# Patient Record
Sex: MALE | Race: WHITE | NOT HISPANIC OR LATINO | URBAN - METROPOLITAN AREA
[De-identification: names, ages, dates, MRNs, and addresses within clinical notes are randomized per-mention and may not be internally consistent; named-entity substitution may affect disease eponyms.]

---

## 2018-01-24 ENCOUNTER — EMERGENCY (EMERGENCY)
Facility: HOSPITAL | Age: 78
LOS: 1 days | Discharge: HOME | End: 2018-01-24
Attending: EMERGENCY MEDICINE
Payer: COMMERCIAL

## 2018-01-24 VITALS
TEMPERATURE: 98 F | OXYGEN SATURATION: 97 % | RESPIRATION RATE: 18 BRPM | HEART RATE: 73 BPM | SYSTOLIC BLOOD PRESSURE: 116 MMHG | DIASTOLIC BLOOD PRESSURE: 68 MMHG

## 2018-01-24 PROCEDURE — 73630 X-RAY EXAM OF FOOT: CPT | Mod: 26,RT

## 2018-01-24 PROCEDURE — 29515 APPLICATION SHORT LEG SPLINT: CPT | Mod: RT

## 2018-01-24 PROCEDURE — 99284 EMERGENCY DEPT VISIT MOD MDM: CPT

## 2018-01-24 PROCEDURE — 99284 EMERGENCY DEPT VISIT MOD MDM: CPT | Mod: 25

## 2018-01-24 PROCEDURE — 73610 X-RAY EXAM OF ANKLE: CPT

## 2018-01-24 PROCEDURE — 73630 X-RAY EXAM OF FOOT: CPT

## 2018-01-24 PROCEDURE — 73610 X-RAY EXAM OF ANKLE: CPT | Mod: 26,RT

## 2018-01-24 RX ORDER — ACETAMINOPHEN 500 MG
975 TABLET ORAL ONCE
Qty: 0 | Refills: 0 | Status: COMPLETED | OUTPATIENT
Start: 2018-01-24 | End: 2018-01-24

## 2018-01-24 RX ADMIN — Medication 975 MILLIGRAM(S): at 17:16

## 2018-01-24 NOTE — ED PROVIDER NOTE - CARE PLAN
Principal Discharge DX:	Acute gout involving toe of right foot, unspecified cause Principal Discharge DX:	Sprain of other ligament of right ankle, initial encounter

## 2018-01-24 NOTE — ED PROVIDER NOTE - PROGRESS NOTE DETAILS
pain not significantly changed, xr wnl. d/w pt regarding f/u and return. pt to be splinted by mil. .

## 2018-01-24 NOTE — ED PROVIDER NOTE - OBJECTIVE STATEMENT
#[id number]. 78 y/o M pt with no PMHx and no PSHx presents to ED c/o R foot pain with associated R foot swelling s/p dropping heavy luggage on his R foot today. Pt reports pain is worsen when walking. Pt denies numbness, tingling, or any other complaints. Pt also denies Hx of memory problems, Hx of smoking, EtOH use/abuse, drug use/abuse, Hx of DM, Hx of HLD, Hx of HTN, or Hx of MI. NKDA.

## 2018-01-24 NOTE — ED PROVIDER NOTE - PRINCIPAL DIAGNOSIS
Acute gout involving toe of right foot, unspecified cause Sprain of other ligament of right ankle, initial encounter

## 2018-01-24 NOTE — ED PROVIDER NOTE - MEDICAL DECISION MAKING DETAILS
76 y/o M pt presents with R ankle and R foot pain. Plan for X-Ray of R ankle, X-Ray of R foot, pain medication, and reassess. 78 y/o M pt presents with R ankle and R foot pain. Plan for X-Ray of   R foot, pain medication, and reassess. pt in hard sole shoe

## 2018-01-26 NOTE — CONSULT NOTE ADULT - SUBJECTIVE AND OBJECTIVE BOX
HPI: Patient is 78 y/o M who presents to ED c/o R foot and ankle pain and swelling. Patient states that he dropped a heavy luggage on his R foot today. Pt reports pain is worsen when walking      PAST MEDICAL & SURGICAL HISTORY:  No pertinent past medical history  No significant past surgical history      Review of systems: Non Contributory    MEDICATIONS  (STANDING):    Allergies: No known Allergies    Physical Examination:    Musculoskeletal:   Right foot and ankle area examination shows swelling on the dorsum of the foot, as well as the lateral side of the ankle area. There is pain to palpation and range of motion is decreased.     Neurovascularly Intact    RADIOLOGY & ADDITIONAL STUDIES: X-ray of right foot and ankle done today is unremarkable.     ASSESSMENT: Right foot ankle sprain.     PLAN/RECOMMENDATION: Short leg splint was applied. Cane was given.     FOLLOW UP: fu 1-2 weeks.

## 2019-05-27 ENCOUNTER — INPATIENT (INPATIENT)
Facility: HOSPITAL | Age: 79
LOS: 0 days | Discharge: HOME | DRG: 195 | End: 2019-05-28
Attending: HOSPITALIST | Admitting: HOSPITALIST
Payer: COMMERCIAL

## 2019-05-28 VITALS
HEART RATE: 85 BPM | WEIGHT: 164.91 LBS | HEIGHT: 60 IN | TEMPERATURE: 99 F | SYSTOLIC BLOOD PRESSURE: 161 MMHG | OXYGEN SATURATION: 96 % | DIASTOLIC BLOOD PRESSURE: 77 MMHG | RESPIRATION RATE: 20 BRPM

## 2019-05-28 VITALS
RESPIRATION RATE: 18 BRPM | DIASTOLIC BLOOD PRESSURE: 63 MMHG | SYSTOLIC BLOOD PRESSURE: 124 MMHG | HEART RATE: 58 BPM | OXYGEN SATURATION: 100 % | TEMPERATURE: 98 F

## 2019-05-28 DIAGNOSIS — J18.9 PNEUMONIA, UNSPECIFIED ORGANISM: ICD-10-CM

## 2019-05-28 DIAGNOSIS — Z29.9 ENCOUNTER FOR PROPHYLACTIC MEASURES, UNSPECIFIED: ICD-10-CM

## 2019-05-28 DIAGNOSIS — I10 ESSENTIAL (PRIMARY) HYPERTENSION: ICD-10-CM

## 2019-05-28 DIAGNOSIS — E78.5 HYPERLIPIDEMIA, UNSPECIFIED: ICD-10-CM

## 2019-05-28 LAB
24R-OH-CALCIDIOL SERPL-MCNC: 19.3 NG/ML — LOW (ref 30–80)
ALBUMIN SERPL ELPH-MCNC: 3.6 G/DL — SIGNIFICANT CHANGE UP (ref 3.5–5)
ALP SERPL-CCNC: 79 U/L — SIGNIFICANT CHANGE UP (ref 40–120)
ALT FLD-CCNC: 19 U/L DA — SIGNIFICANT CHANGE UP (ref 10–60)
ANION GAP SERPL CALC-SCNC: 7 MMOL/L — SIGNIFICANT CHANGE UP (ref 5–17)
ANION GAP SERPL CALC-SCNC: 7 MMOL/L — SIGNIFICANT CHANGE UP (ref 5–17)
APPEARANCE UR: CLEAR — SIGNIFICANT CHANGE UP
APTT BLD: 37.1 SEC — HIGH (ref 27.5–36.3)
AST SERPL-CCNC: 21 U/L — SIGNIFICANT CHANGE UP (ref 10–40)
BASOPHILS # BLD AUTO: 0.02 K/UL — SIGNIFICANT CHANGE UP (ref 0–0.2)
BASOPHILS # BLD AUTO: 0.03 K/UL — SIGNIFICANT CHANGE UP (ref 0–0.2)
BASOPHILS NFR BLD AUTO: 0.2 % — SIGNIFICANT CHANGE UP (ref 0–2)
BASOPHILS NFR BLD AUTO: 0.3 % — SIGNIFICANT CHANGE UP (ref 0–2)
BILIRUB SERPL-MCNC: 0.9 MG/DL — SIGNIFICANT CHANGE UP (ref 0.2–1.2)
BILIRUB UR-MCNC: NEGATIVE — SIGNIFICANT CHANGE UP
BUN SERPL-MCNC: 10 MG/DL — SIGNIFICANT CHANGE UP (ref 7–18)
BUN SERPL-MCNC: 19 MG/DL — HIGH (ref 7–18)
CALCIUM SERPL-MCNC: 8.2 MG/DL — LOW (ref 8.4–10.5)
CALCIUM SERPL-MCNC: 8.2 MG/DL — LOW (ref 8.4–10.5)
CHLORIDE SERPL-SCNC: 107 MMOL/L — SIGNIFICANT CHANGE UP (ref 96–108)
CHLORIDE SERPL-SCNC: 110 MMOL/L — HIGH (ref 96–108)
CHOLEST SERPL-MCNC: 170 MG/DL — SIGNIFICANT CHANGE UP (ref 10–199)
CO2 SERPL-SCNC: 24 MMOL/L — SIGNIFICANT CHANGE UP (ref 22–31)
CO2 SERPL-SCNC: 26 MMOL/L — SIGNIFICANT CHANGE UP (ref 22–31)
COLOR SPEC: YELLOW — SIGNIFICANT CHANGE UP
CREAT SERPL-MCNC: 0.72 MG/DL — SIGNIFICANT CHANGE UP (ref 0.5–1.3)
CREAT SERPL-MCNC: 0.74 MG/DL — SIGNIFICANT CHANGE UP (ref 0.5–1.3)
DIFF PNL FLD: NEGATIVE — SIGNIFICANT CHANGE UP
EOSINOPHIL # BLD AUTO: 0.07 K/UL — SIGNIFICANT CHANGE UP (ref 0–0.5)
EOSINOPHIL # BLD AUTO: 0.08 K/UL — SIGNIFICANT CHANGE UP (ref 0–0.5)
EOSINOPHIL NFR BLD AUTO: 0.6 % — SIGNIFICANT CHANGE UP (ref 0–6)
EOSINOPHIL NFR BLD AUTO: 0.6 % — SIGNIFICANT CHANGE UP (ref 0–6)
GLUCOSE SERPL-MCNC: 111 MG/DL — HIGH (ref 70–99)
GLUCOSE SERPL-MCNC: 128 MG/DL — HIGH (ref 70–99)
GLUCOSE UR QL: NEGATIVE — SIGNIFICANT CHANGE UP
HBA1C BLD-MCNC: 5.7 % — HIGH (ref 4–5.6)
HCT VFR BLD CALC: 39.3 % — SIGNIFICANT CHANGE UP (ref 39–50)
HCT VFR BLD CALC: 40 % — SIGNIFICANT CHANGE UP (ref 39–50)
HDLC SERPL-MCNC: 58 MG/DL — SIGNIFICANT CHANGE UP
HGB BLD-MCNC: 12.5 G/DL — LOW (ref 13–17)
HGB BLD-MCNC: 13.1 G/DL — SIGNIFICANT CHANGE UP (ref 13–17)
IMM GRANULOCYTES NFR BLD AUTO: 0.4 % — SIGNIFICANT CHANGE UP (ref 0–1.5)
IMM GRANULOCYTES NFR BLD AUTO: 0.6 % — SIGNIFICANT CHANGE UP (ref 0–1.5)
INR BLD: 1.17 RATIO — HIGH (ref 0.88–1.16)
KETONES UR-MCNC: ABNORMAL
LACTATE SERPL-SCNC: 0.8 MMOL/L — SIGNIFICANT CHANGE UP (ref 0.7–2)
LEUKOCYTE ESTERASE UR-ACNC: ABNORMAL
LIPID PNL WITH DIRECT LDL SERPL: 97 MG/DL — SIGNIFICANT CHANGE UP
LYMPHOCYTES # BLD AUTO: 0.7 K/UL — LOW (ref 1–3.3)
LYMPHOCYTES # BLD AUTO: 1.31 K/UL — SIGNIFICANT CHANGE UP (ref 1–3.3)
LYMPHOCYTES # BLD AUTO: 11.7 % — LOW (ref 13–44)
LYMPHOCYTES # BLD AUTO: 5.6 % — LOW (ref 13–44)
MAGNESIUM SERPL-MCNC: 2.2 MG/DL — SIGNIFICANT CHANGE UP (ref 1.6–2.6)
MCHC RBC-ENTMCNC: 31.1 PG — SIGNIFICANT CHANGE UP (ref 27–34)
MCHC RBC-ENTMCNC: 31.3 PG — SIGNIFICANT CHANGE UP (ref 27–34)
MCHC RBC-ENTMCNC: 31.8 GM/DL — LOW (ref 32–36)
MCHC RBC-ENTMCNC: 32.8 GM/DL — SIGNIFICANT CHANGE UP (ref 32–36)
MCV RBC AUTO: 95.7 FL — SIGNIFICANT CHANGE UP (ref 80–100)
MCV RBC AUTO: 97.8 FL — SIGNIFICANT CHANGE UP (ref 80–100)
MONOCYTES # BLD AUTO: 0.53 K/UL — SIGNIFICANT CHANGE UP (ref 0–0.9)
MONOCYTES # BLD AUTO: 0.69 K/UL — SIGNIFICANT CHANGE UP (ref 0–0.9)
MONOCYTES NFR BLD AUTO: 4.7 % — SIGNIFICANT CHANGE UP (ref 2–14)
MONOCYTES NFR BLD AUTO: 5.5 % — SIGNIFICANT CHANGE UP (ref 2–14)
NEUTROPHILS # BLD AUTO: 11.03 K/UL — HIGH (ref 1.8–7.4)
NEUTROPHILS # BLD AUTO: 9.2 K/UL — HIGH (ref 1.8–7.4)
NEUTROPHILS NFR BLD AUTO: 82.3 % — HIGH (ref 43–77)
NEUTROPHILS NFR BLD AUTO: 87.5 % — HIGH (ref 43–77)
NITRITE UR-MCNC: NEGATIVE — SIGNIFICANT CHANGE UP
NRBC # BLD: 0 /100 WBCS — SIGNIFICANT CHANGE UP (ref 0–0)
NRBC # BLD: 0 /100 WBCS — SIGNIFICANT CHANGE UP (ref 0–0)
PH UR: 5 — SIGNIFICANT CHANGE UP (ref 5–8)
PHOSPHATE SERPL-MCNC: 2.2 MG/DL — LOW (ref 2.5–4.5)
PLATELET # BLD AUTO: 159 K/UL — SIGNIFICANT CHANGE UP (ref 150–400)
PLATELET # BLD AUTO: 168 K/UL — SIGNIFICANT CHANGE UP (ref 150–400)
POTASSIUM SERPL-MCNC: 3.6 MMOL/L — SIGNIFICANT CHANGE UP (ref 3.5–5.3)
POTASSIUM SERPL-MCNC: 4 MMOL/L — SIGNIFICANT CHANGE UP (ref 3.5–5.3)
POTASSIUM SERPL-SCNC: 3.6 MMOL/L — SIGNIFICANT CHANGE UP (ref 3.5–5.3)
POTASSIUM SERPL-SCNC: 4 MMOL/L — SIGNIFICANT CHANGE UP (ref 3.5–5.3)
PROCALCITONIN SERPL-MCNC: 0.05 NG/ML — SIGNIFICANT CHANGE UP (ref 0.02–0.1)
PROT SERPL-MCNC: 7.5 G/DL — SIGNIFICANT CHANGE UP (ref 6–8.3)
PROT UR-MCNC: 15
PROTHROM AB SERPL-ACNC: 13.1 SEC — HIGH (ref 10–12.9)
RBC # BLD: 4.02 M/UL — LOW (ref 4.2–5.8)
RBC # BLD: 4.18 M/UL — LOW (ref 4.2–5.8)
RBC # FLD: 13.5 % — SIGNIFICANT CHANGE UP (ref 10.3–14.5)
RBC # FLD: 13.5 % — SIGNIFICANT CHANGE UP (ref 10.3–14.5)
SODIUM SERPL-SCNC: 138 MMOL/L — SIGNIFICANT CHANGE UP (ref 135–145)
SODIUM SERPL-SCNC: 143 MMOL/L — SIGNIFICANT CHANGE UP (ref 135–145)
SP GR SPEC: 1.02 — SIGNIFICANT CHANGE UP (ref 1.01–1.02)
TOTAL CHOLESTEROL/HDL RATIO MEASUREMENT: 2.9 RATIO — LOW (ref 3.4–9.6)
TRIGL SERPL-MCNC: 74 MG/DL — SIGNIFICANT CHANGE UP (ref 10–149)
UROBILINOGEN FLD QL: NEGATIVE — SIGNIFICANT CHANGE UP
WBC # BLD: 11.18 K/UL — HIGH (ref 3.8–10.5)
WBC # BLD: 12.59 K/UL — HIGH (ref 3.8–10.5)
WBC # FLD AUTO: 11.18 K/UL — HIGH (ref 3.8–10.5)
WBC # FLD AUTO: 12.59 K/UL — HIGH (ref 3.8–10.5)

## 2019-05-28 PROCEDURE — 82306 VITAMIN D 25 HYDROXY: CPT

## 2019-05-28 PROCEDURE — 96365 THER/PROPH/DIAG IV INF INIT: CPT

## 2019-05-28 PROCEDURE — 99285 EMERGENCY DEPT VISIT HI MDM: CPT

## 2019-05-28 PROCEDURE — 93005 ELECTROCARDIOGRAM TRACING: CPT

## 2019-05-28 PROCEDURE — 81001 URINALYSIS AUTO W/SCOPE: CPT

## 2019-05-28 PROCEDURE — 87086 URINE CULTURE/COLONY COUNT: CPT

## 2019-05-28 PROCEDURE — 83605 ASSAY OF LACTIC ACID: CPT

## 2019-05-28 PROCEDURE — 83735 ASSAY OF MAGNESIUM: CPT

## 2019-05-28 PROCEDURE — 85730 THROMBOPLASTIN TIME PARTIAL: CPT

## 2019-05-28 PROCEDURE — 99285 EMERGENCY DEPT VISIT HI MDM: CPT | Mod: 25

## 2019-05-28 PROCEDURE — 85027 COMPLETE CBC AUTOMATED: CPT

## 2019-05-28 PROCEDURE — 87040 BLOOD CULTURE FOR BACTERIA: CPT

## 2019-05-28 PROCEDURE — 84145 PROCALCITONIN (PCT): CPT

## 2019-05-28 PROCEDURE — 99223 1ST HOSP IP/OBS HIGH 75: CPT

## 2019-05-28 PROCEDURE — 80048 BASIC METABOLIC PNL TOTAL CA: CPT

## 2019-05-28 PROCEDURE — 71046 X-RAY EXAM CHEST 2 VIEWS: CPT

## 2019-05-28 PROCEDURE — 80061 LIPID PANEL: CPT

## 2019-05-28 PROCEDURE — 83036 HEMOGLOBIN GLYCOSYLATED A1C: CPT

## 2019-05-28 PROCEDURE — 36415 COLL VENOUS BLD VENIPUNCTURE: CPT

## 2019-05-28 PROCEDURE — 93010 ELECTROCARDIOGRAM REPORT: CPT

## 2019-05-28 PROCEDURE — 87186 SC STD MICRODIL/AGAR DIL: CPT

## 2019-05-28 PROCEDURE — 85610 PROTHROMBIN TIME: CPT

## 2019-05-28 PROCEDURE — 71046 X-RAY EXAM CHEST 2 VIEWS: CPT | Mod: 26

## 2019-05-28 PROCEDURE — 96375 TX/PRO/DX INJ NEW DRUG ADDON: CPT

## 2019-05-28 PROCEDURE — 80053 COMPREHEN METABOLIC PANEL: CPT

## 2019-05-28 PROCEDURE — 84100 ASSAY OF PHOSPHORUS: CPT

## 2019-05-28 RX ORDER — SIMVASTATIN 20 MG/1
20 TABLET, FILM COATED ORAL AT BEDTIME
Refills: 0 | Status: DISCONTINUED | OUTPATIENT
Start: 2019-05-28 | End: 2019-05-28

## 2019-05-28 RX ORDER — SODIUM CHLORIDE 9 MG/ML
2300 INJECTION INTRAMUSCULAR; INTRAVENOUS; SUBCUTANEOUS ONCE
Refills: 0 | Status: COMPLETED | OUTPATIENT
Start: 2019-05-28 | End: 2019-05-28

## 2019-05-28 RX ORDER — LOSARTAN POTASSIUM 100 MG/1
1 TABLET, FILM COATED ORAL
Qty: 0 | Refills: 0 | DISCHARGE
Start: 2019-05-28

## 2019-05-28 RX ORDER — CIPROFLOXACIN LACTATE 400MG/40ML
1 VIAL (ML) INTRAVENOUS
Qty: 5 | Refills: 0
Start: 2019-05-28 | End: 2019-06-01

## 2019-05-28 RX ORDER — AZITHROMYCIN 500 MG/1
500 TABLET, FILM COATED ORAL EVERY 24 HOURS
Refills: 0 | Status: DISCONTINUED | OUTPATIENT
Start: 2019-05-28 | End: 2019-05-28

## 2019-05-28 RX ORDER — AZITHROMYCIN 500 MG/1
500 TABLET, FILM COATED ORAL ONCE
Refills: 0 | Status: COMPLETED | OUTPATIENT
Start: 2019-05-28 | End: 2019-05-28

## 2019-05-28 RX ORDER — CEFTRIAXONE 500 MG/1
1 INJECTION, POWDER, FOR SOLUTION INTRAMUSCULAR; INTRAVENOUS ONCE
Refills: 0 | Status: COMPLETED | OUTPATIENT
Start: 2019-05-28 | End: 2019-05-28

## 2019-05-28 RX ORDER — CEFTRIAXONE 500 MG/1
1 INJECTION, POWDER, FOR SOLUTION INTRAMUSCULAR; INTRAVENOUS EVERY 24 HOURS
Refills: 0 | Status: DISCONTINUED | OUTPATIENT
Start: 2019-05-28 | End: 2019-05-28

## 2019-05-28 RX ORDER — LOSARTAN POTASSIUM 100 MG/1
50 TABLET, FILM COATED ORAL DAILY
Refills: 0 | Status: DISCONTINUED | OUTPATIENT
Start: 2019-05-28 | End: 2019-05-28

## 2019-05-28 RX ORDER — SIMVASTATIN 20 MG/1
1 TABLET, FILM COATED ORAL
Qty: 0 | Refills: 0 | DISCHARGE
Start: 2019-05-28

## 2019-05-28 RX ADMIN — CEFTRIAXONE 100 GRAM(S): 500 INJECTION, POWDER, FOR SOLUTION INTRAMUSCULAR; INTRAVENOUS at 07:26

## 2019-05-28 RX ADMIN — AZITHROMYCIN 250 MILLIGRAM(S): 500 TABLET, FILM COATED ORAL at 04:34

## 2019-05-28 RX ADMIN — AZITHROMYCIN 250 MILLIGRAM(S): 500 TABLET, FILM COATED ORAL at 09:23

## 2019-05-28 RX ADMIN — SODIUM CHLORIDE 2300 MILLILITER(S): 9 INJECTION INTRAMUSCULAR; INTRAVENOUS; SUBCUTANEOUS at 02:02

## 2019-05-28 RX ADMIN — CEFTRIAXONE 100 GRAM(S): 500 INJECTION, POWDER, FOR SOLUTION INTRAMUSCULAR; INTRAVENOUS at 01:06

## 2019-05-28 RX ADMIN — CEFTRIAXONE 1 GRAM(S): 500 INJECTION, POWDER, FOR SOLUTION INTRAMUSCULAR; INTRAVENOUS at 02:02

## 2019-05-28 RX ADMIN — SODIUM CHLORIDE 2300 MILLILITER(S): 9 INJECTION INTRAMUSCULAR; INTRAVENOUS; SUBCUTANEOUS at 01:06

## 2019-05-28 NOTE — PROGRESS NOTE ADULT - SUBJECTIVE AND OBJECTIVE BOX
NP Note     78 year old male from home with PMHx of vertigo, HTN, and HLD presented with cough and fever. CXR showed retrosternal infiltrate. Pt admitted for community acquired pneumonia. IV antibiotics:  azithromycin and Rocephin continued. Blood culture pending. Pt seen and examined, feeling better. Denies fever, chills. VS stable   Pt seen and examined, feeling better, co of intermittent cough. Denies fever, chills, chest pain or SOB. VS stable          INTERVAL HPI/OVERNIGHT EVENTS: no new complaints    MEDICATIONS  (STANDING):  azithromycin  IVPB 500 milliGRAM(s) IV Intermittent every 24 hours  cefTRIAXone   IVPB 1 Gram(s) IV Intermittent every 24 hours    MEDICATIONS  (PRN):      __________________________________________________  REVIEW OF SYSTEMS:    CONSTITUTIONAL: No fever,   EYES: no acute visual disturbances  NECK: No pain or stiffness  RESPIRATORY: + cough; No shortness of breath  CARDIOVASCULAR: No chest pain, no palpitations  GASTROINTESTINAL: No pain. No nausea or vomiting; No diarrhea   NEUROLOGICAL: No headache or numbness, no tremors  MUSCULOSKELETAL: No joint pain, no muscle pain  GENITOURINARY: no dysuria, no frequency, no hesitancy  PSYCHIATRY: no depression , no anxiety  ALL OTHER  ROS negative        Vital Signs Last 24 Hrs  T(C): 37.1 (28 May 2019 07:13), Max: 37.3 (28 May 2019 00:02)  T(F): 98.7 (28 May 2019 07:13), Max: 99.2 (28 May 2019 00:02)  HR: 61 (28 May 2019 07:13) (61 - 85)  BP: 107/62 (28 May 2019 07:13) (102/54 - 161/77)  BP(mean): --  RR: 18 (28 May 2019 07:13) (17 - 20)  SpO2: 98% (28 May 2019 07:13) (96% - 98%)    ________________________________________________  PHYSICAL EXAM:  GENERAL: NAD  HEENT: Normocephalic;  conjunctivae and sclerae clear; moist mucous membranes;   NECK : supple  CHEST/LUNG: Clear to auscultation  HEART: S1 S2  regular;  ABDOMEN: Soft, Nontender, Nondistended; Bowel sounds present  EXTREMITIES: no cyanosis; no edema; no calf tenderness  SKIN: warm and dry; no rash  NERVOUS SYSTEM:  Awake and alert; Oriented  to place, person and time ; no new deficits    _________________________________________________  LABS:                        13.1   12.59 )-----------( 168      ( 28 May 2019 00:59 )             40.0         138  |  107  |  19<H>  ----------------------------<  128<H>  4.0   |  24  |  0.72    Ca    8.2<L>      28 May 2019 00:59    TPro  7.5  /  Alb  3.6  /  TBili  0.9  /  DBili  x   /  AST  21  /  ALT  19  /  AlkPhos  79      PT/INR - ( 28 May 2019 00:59 )   PT: 13.1 sec;   INR: 1.17 ratio         PTT - ( 28 May 2019 00:59 )  PTT:37.1 sec  Urinalysis Basic - ( 28 May 2019 00:59 )    Color: Yellow / Appearance: Clear / S.020 / pH: x  Gluc: x / Ketone: Trace  / Bili: Negative / Urobili: Negative   Blood: x / Protein: 15 / Nitrite: Negative   Leuk Esterase: Small / RBC: 0-2 /HPF / WBC 0-2 /HPF   Sq Epi: x / Non Sq Epi: Occasional /HPF / Bacteria: Few /HPF      CAPILLARY BLOOD GLUCOSE            RADIOLOGY & ADDITIONAL TESTS:    Imaging Personally Reviewed:  YES/NO    Consultant(s) Notes Reviewed:   YES/ No    Care Discussed with Consultants :     Plan of care was discussed with patient and /or primary care giver; all questions and concerns were addressed and care was aligned with patient's wishes.

## 2019-05-28 NOTE — ED ADULT NURSE NOTE - OBJECTIVE STATEMENT
pt came in for c/o fever, cough, chills & headache. Denies cp, sob. breathing unlabored, non-diaphoretic. Ambulatory w/ steady gait.

## 2019-05-28 NOTE — PROGRESS NOTE ADULT - PROBLEM SELECTOR PLAN 1
CXR + retrosternal infiltrate  - c/w Rocephin and Zithromax  - f/u blood culture   - f/u procalcitonin   - symptomatic treatment as needed

## 2019-05-28 NOTE — DISCHARGE NOTE PROVIDER - NSDCCPCAREPLAN_GEN_ALL_CORE_FT
PRINCIPAL DISCHARGE DIAGNOSIS  Diagnosis: Pneumonia  Assessment and Plan of Treatment: You were admitted to the hospital for pneumonia (lung infection)   You were treated with IV antibiotics. You need to continue oral antibiotic at home.  Please continue .......  Rest, stay well hydrated. Follow up with primary care doctor for reevaluation within one week.      SECONDARY DISCHARGE DIAGNOSES  Diagnosis: HTN (hypertension)  Assessment and Plan of Treatment: Continue Losartan 50 mg daily as prescribed by your doctor   Monitor your blood pressure. the goal is to keep your blood pressure at 140/80 or lower. Recommend the DASH Diet. This diet emphasizes vegetables, fruits, and fat-free or low-fat dairy products.  Includes whole grains, fish, poultry, beans, seeds, nuts, and vegetable oils. Please limit sodium, sweets, sugary beverages, and red meats.  Follow up with your doctor for further management    Diagnosis: HLD (hyperlipidemia)  Assessment and Plan of Treatment: Continue Simvastatin 20 mg daily. Follow up with your doctor for further management PRINCIPAL DISCHARGE DIAGNOSIS  Diagnosis: Pneumonia  Assessment and Plan of Treatment: You were admitted to the hospital for pneumonia (lung infection)   You were treated with IV antibiotics. You need to continue oral antibiotic at home.  Please continue oral antibiotic Levaquin 500 mg daily for 5 days  Rest, stay well hydrated. Follow up with primary care doctor for reevaluation within one week.      SECONDARY DISCHARGE DIAGNOSES  Diagnosis: HTN (hypertension)  Assessment and Plan of Treatment: Continue Losartan 50 mg daily as prescribed by your doctor   Monitor your blood pressure. the goal is to keep your blood pressure at 140/80 or lower. Recommend the DASH Diet. This diet emphasizes vegetables, fruits, and fat-free or low-fat dairy products.  Includes whole grains, fish, poultry, beans, seeds, nuts, and vegetable oils. Please limit sodium, sweets, sugary beverages, and red meats.  Follow up with your doctor for further management    Diagnosis: HLD (hyperlipidemia)  Assessment and Plan of Treatment: Continue Simvastatin 20 mg daily. Follow up with your doctor for further management

## 2019-05-28 NOTE — DISCHARGE NOTE PROVIDER - PROVIDER TOKENS
FREE:[LAST:[PCP],PHONE:[(   )    -],FAX:[(   )    -],ADDRESS:[Please make appointment to see your PCP within 3days of dischareg form the hospital.],FOLLOWUP:[1 week]]

## 2019-05-28 NOTE — ED ADULT TRIAGE NOTE - CHIEF COMPLAINT QUOTE
pt compliant of a fever that started on Friday, was taking Tylenol but the fever keeps going up and down and he has a cough.

## 2019-05-28 NOTE — ED PROVIDER NOTE - NS ED MD DISPO SPECIAL CONSIDERATION1
[address] FAX: [phone]        Vascular Surgery Progress Note    Admit Date: 2017  POD 1 Day Post-Op    Procedure:  Procedure(s):  IR ANESTHESIA- RIGHT LEG ARTERIOGRAM/ 604      Subjective:     Patient has no new complaints. Patient states he is tired from PT. Objective:     Blood pressure 154/83, pulse 88, temperature 98 °F (36.7 °C), resp. rate 18, height 6' 1\" (1.854 m), weight 212 lb 15.4 oz (96.6 kg), SpO2 98 %. Temp (24hrs), Av.1 °F (36.7 °C), Min:97.6 °F (36.4 °C), Max:98.6 °F (37 °C)      Physical Exam:  GENERAL: alert, cooperative, no distress, LUNG:  clear to auscultation bilaterally, HEART:  regular rate and rhythm, S1, S2 normal, no murmur, click, rub or gallop and EXTREMITIES:  Right foot covered with Kerlex. Pressure dressing remains in place to groin.  Will remove    Labs:   Recent Labs      17   1038  17   0550   HGB  6.6*   --    WBC  9.0   --    K   --   3.8   GLU   --   120*       Data Review: images and reports reviewed  Current Facility-Administered Medications   Medication Dose Route Frequency    torsemide (DEMADEX) tablet 40 mg  40 mg Oral DAILY    sodium hypochlorite (QUARTER STRENGTH DAKIN'S) 0.125% irrigation (bottle)   Topical DAILY    insulin glargine (LANTUS) injection 12 Units  12 Units SubCUTAneous DAILY    heparin (porcine) injection 5,000 Units  5,000 Units SubCUTAneous Q8H    0.9% sodium chloride infusion  125 mL/hr IntraVENous CONTINUOUS    sodium chloride (NS) flush 5 mL  5 mL InterCATHeter PRN    pantoprazole (PROTONIX) tablet 40 mg  40 mg Oral ACB    HYDROmorphone (PF) (DILAUDID) injection 2 mg  2 mg IntraVENous PRN    cefTRIAXone (ROCEPHIN) 2 g in 0.9% sodium chloride (MBP/ADV) 50 mL  2 g IntraVENous Q24H    metroNIDAZOLE (FLAGYL) tablet 500 mg  500 mg Oral TID    HYDROmorphone (PF) (DILAUDID) injection 0.5-1 mg  0.5-1 mg IntraVENous Q3H PRN    oxyCODONE IR (ROXICODONE) tablet 5 mg  5 mg Oral Q4H PRN    insulin regular (NOVOLIN R, HUMULIN R) injection   SubCUTAneous AC&HS    amLODIPine (NORVASC) tablet 10 mg  10 mg Oral DAILY    hydrALAZINE (APRESOLINE) 20 mg/mL injection 20 mg  20 mg IntraVENous Q6H PRN    haloperidol lactate (HALDOL) injection 2 mg  2 mg IntraVENous Q4H PRN    0.9% sodium chloride infusion 250 mL  250 mL IntraVENous PRN    diphenhydrAMINE (BENADRYL) injection 25 mg  25 mg IntraVENous Q4H PRN    traMADol (ULTRAM) tablet 50 mg  50 mg Oral Q6H PRN    atorvastatin (LIPITOR) tablet 80 mg  80 mg Oral DAILY    metoprolol succinate (TOPROL-XL) tablet 100 mg  100 mg Oral DAILY WITH BREAKFAST    sodium chloride (NS) flush 5-10 mL  5-10 mL IntraVENous Q8H    sodium chloride (NS) flush 5-10 mL  5-10 mL IntraVENous PRN    acetaminophen (TYLENOL) tablet 650 mg  650 mg Oral Q4H PRN    ondansetron (ZOFRAN) injection 4 mg  4 mg IntraVENous Q4H PRN    docusate sodium (COLACE) capsule 100 mg  100 mg Oral DAILY PRN     Assessment:     Principal Problem:    Type 2 diabetes mellitus with right diabetic foot infection (HCC) (1/5/2017)    Active Problems:    Type 2 diabetes mellitus with hyperglycemia (HCC) (7/29/2012)      HTN (hypertension) (7/29/2012)      Chronic systolic congestive heart failure ([address] (12/30/2015)      Overview: EF 35-40% on 2015 Echo      Kidney disease, chronic, stage III (moderate, EGFR 30-59 ml/min) (8/11/2016)      Acute renal failure with tubular necrosis ([address] (1/6/2017)      NSTEMI (non-ST elevated myocardial infarction) ([address] (1/5/2017)      Sepsis due to cellulitis ([address] (1/9/2017)      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  As a result, errors of speech recognition may have occurred. None

## 2019-05-28 NOTE — H&P ADULT - HISTORY OF PRESENT ILLNESS
Patient is a 78 Y old male from home with PMHx of vertigo, HTN, and HLD presented with cough and fever for last 3 days. Patient reports he was having high fever along with productive cough with brown sputum, headaches fatigue and malaise. Patient was taking Advil with no improvement in his symptoms. [place] recently [place] to [place] back on [place] 1 to [place]. Patient denies any sick contacts, recent hospitalization or use of antibiotics. He denies chest pian, sob, nausea, vomiting, diarrhea, abdominal pain, dysuria, skin rashes, joint pains, leg swelling.    on admission pt was afebrile, HD stable   CBC showed leucocytosis 12 with left sift   normal electrolytes and renal functions   CXR showed retro-sternal infiltrate     Primary team to follow up home medications Patient is a 78 Y old male from home with PMHx of vertigo, HTN, and HLD presented with cough and fever for last 3 days. Patient reports he was having high fever along with productive cough with brown sputum, headaches fatigue and malaise. Patient was taking Advil with no improvement in his symptoms. Patient recently traveled to [place] came back on [place] 1 to [place]. Patient denies any sick contacts, recent hospitalization or use of antibiotics. He denies chest pian, sob, nausea, vomiting, diarrhea, abdominal pain, dysuria, skin rashes, joint pains, leg swelling.    on admission pt was afebrile, HD stable   CBC showed leucocytosis 12 with left sift   normal electrolytes and renal functions   CXR showed retro-cardiac  infiltrate     Primary team to follow up home medications

## 2019-05-28 NOTE — ED PROVIDER NOTE - OBJECTIVE STATEMENT
77 y/o M pt with a PMHx of HTN and no significant PSHx of presents to the ED c/o productive cough and fever x past 3 days. Pt denies cp, sob or any other complaints. Non-smoker. NKDA.

## 2019-05-28 NOTE — H&P ADULT - ASSESSMENT
Patient is a 78 Y old male from home with PMHx of vertigo, HTN, and HLD presented with cough and fever for last 3 days. Patient reports he was having high fever along with productive cough with brown sputum, headaches fatigue and malaise. Patient was taking Advil with no improvement in his symptoms. Patient recently traveled to [place] came back on [place] 1 to [place]. Patient denies any sick contacts, recent hospitalization or use of antibiotics. He denies chest pian, sob, nausea, vomiting, diarrhea, abdominal pain, dysuria, skin rashes, joint pains, leg swelling.    on admission pt was afebrile, HD stable   CBC showed leucocytosis 12 with left sift   normal electrolytes and renal functions   CXR showed retro-sternal infiltrate Patient is a 78 Y old male from home with PMHx of vertigo, HTN, and HLD presented with cough and fever for last 3 days. Patient reports he was having high fever along with productive cough with brown sputum, headaches fatigue and malaise. Patient was taking Advil with no improvement in his symptoms. Patient recently traveled to [place] came back on [place] 1 to [place]. Patient denies any sick contacts, recent hospitalization or use of antibiotics. He denies chest pian, sob, nausea, vomiting, diarrhea, abdominal pain, dysuria, skin rashes, joint pains, leg swelling.    on admission pt was afebrile, HD stable   CBC showed leucocytosis 12 with left sift   normal electrolytes and renal functions   CXR showed retro-cardiac  infiltrate

## 2019-05-28 NOTE — DISCHARGE NOTE PROVIDER - CARE PROVIDER_API CALL
PCP,   Please make appointment to see your PCP within 3days of dischareg form the hospital.  Phone: (   )    -  Fax: (   )    -  Follow Up Time: 1 week

## 2019-05-28 NOTE — ED ADULT NURSE NOTE - ED STAT RN HANDOFF DETAILS 3
patient discharged home as per Md order, IV access removed no redness, swelling or bleeding noted at site. All discharge instructions and F/U visits provided to patient. Prescriptions sent to pharmacy. Patient and daughter verbalizes understanding leaving ambulatory in no acute distress.

## 2019-05-28 NOTE — DISCHARGE NOTE NURSING/CASE MANAGEMENT/SOCIAL WORK - NSDCDPATPORTLINK_GEN_ALL_CORE
You can access the [facility] Patient Portal, offered by [facility], by registering with the following website: [email or web address]

## 2019-05-28 NOTE — ED PROVIDER NOTE - CLINICAL SUMMARY MEDICAL DECISION MAKING FREE TEXT BOX
Risk factors (as per CURB 65 rule). BUN 19, age 78. Pt with crackles on right lung field. MAR endorsed. Pt agrees with admission for IV abx. I had a detailed discussion with the patient and/or guardian regarding the historical points, exam findings, and any diagnostic results supporting the admit diagnosis. Risk factors (as per CURB 65 rule). BUN 19, age 78. Pt with crackles on right lung field. MAR and Dr. Chakraborty endorsed. Pt agrees with admission for IV abx. I had a detailed discussion with the patient and/or guardian regarding the historical points, exam findings, and any diagnostic results supporting the admit diagnosis.

## 2019-05-28 NOTE — ED ADULT NURSE NOTE - NSSBIRTNALOXONE_GEN_A_ED
SUBJECTIVE  Chava Jewell is a 78 year old male who complains of right hip pain for 2 weeks. Onset associated with: no known injury.  Pain is aggravated by changes in positiion.  The pain does radiate to the R knee.  Prior history of back problems: no prior back problems.  Pt does not smoke and is not exposed to second hand smoke. Denies loss of bowel of bladder function.  Pt was seen in ED and did have a CT of the pelvis, pt has not followed up with primary care.  Past Medical History:   Diagnosis Date   • Anemia 2012    Iron Deficiency anemia   • ANEMIA BLOOD LOSS ACUTE 10/23/2006   • Blood clot associated with vein wall inflammation     Hx of PE   • Cancer of skin, squamous cell     removed from the scalp   • Cerebral infarction (CMS/HCC)     TIA per patient   • Coronary artery disease     status post PTCA and stent in 2002   • Diabetes mellitus type II, non insulin dependent (CMS/HCC) 1/23/2018   • Diverticulosis 11/02/2005    Sigmoid   • Esophagitis 12/06/2006    Severe ulcerative   • Failed moderate sedation during procedure 2006    \"Felt everything with last colonoscopy\"   • Gastric ulcer 10/23/2006   • GERD (gastroesophageal reflux disease)    • GI Bleeding 10/23/2006   • Hard of hearing     wears hearing aides    • Hemorrhoids 11/15/2006   • Hiatal Hernia 12/06/2006   • History of renal transplant    • Hyperlipidemia    • Hypertension    • KERATOSIS SOLAR    • Macular degeneration     bilateral, severe per wife    • OBSTRUCTION GASTRIC OUTLET    • Pulmonary embolism (CMS/HCC)    • Renal failure, chronic      Current Outpatient Prescriptions   Medication Sig Dispense Refill   • Multiple Vitamins-Minerals (PRESERVISION AREDS PO) Take 1 capsule by mouth daily.     • tacrolimus (PROGRAF) 1 MG capsule Take 1 capsule by mouth 2 times daily. Or as directed 180 capsule 3   • predniSONE (DELTASONE) 5 MG tablet Take 1 tablet by mouth daily. 90 tablet 3   • mycophenolate (MYFORTIC) 360 MG DR tablet Take 1  tablet by mouth 2 times daily. 180 tablet 3   • amLODIPine (NORVASC) 2.5 MG tablet Take 1 tablet by mouth daily. 90 tablet 3   • atorvastatin (LIPITOR) 80 MG tablet Take 1 tablet by mouth nightly. 90 tablet 3   • lisinopril (ZESTRIL) 20 MG tablet Take 1 tablet by mouth 2 times daily. 180 tablet 3   • metoPROLOL tartrate (LOPRESSOR) 25 MG tablet Take 1 tablet by mouth 2 times daily. 180 tablet 3   • omeprazole (PRILOSEC) 40 MG capsule Take 1 capsule by mouth daily. 90 capsule 3   • tacrolimus (PROGRAF) 0.5 MG capsule Take 1 capsule by mouth 2 times daily. Or as directed (Patient taking differently: Take 0.5 mg by mouth daily. Or as directed.) 180 capsule 3   • ferrous sulfate 325 (65 FE) MG tablet Take 325 mg by mouth three times a week. Mondays, Wednesdays, and Fridays     • Omega-3 Fatty Acids (FISH OIL PO) Take 1,000 mg by mouth daily.      • Cholecalciferol (VITAMIN D-3) 5000 UNITS TABS Take 1 tablet by mouth once a week. Every Sunday at noon.     • calcium carbonate-vitamin D (CALTRATE+D) 600-400 MG-UNIT per tablet Take 1 tablet by mouth Every evening.      • aspirin 81 MG tablet Take 81 mg by mouth every morning.        No current facility-administered medications for this visit.      ALLERGIES:  No Known Allergies  OBJECTIVE:  Visit Vitals  /70 (BP Location: [facility], Patient Position: Sitting, Cuff Size: Regular)   Pulse 76   Temp 97.8 °F (36.6 °C) (Oral)   Resp 18   Ht 5' 8.5\" (1.74 m)   Wt 59.3 kg   BMI 19.59 kg/m²     GEN: Patient appears to be in no pain.  MUSK: Slow otherwise normal gait noted. There is no lumbosacral spine angelo tenderness or mass, positive   for muscle tightness laterally. Pt is able to flex and extend lower legs against resistance. Pt is able to plantar flex and dorsiflex feet against resistance.   NEURO: Straight leg raise is negative  on bilateral side(s). Intact nerve roots L4,5 and S 1 motor, sensory and reflex exam.  CARD: Peripheral pulses/capillary refill are normal, no  abdominal bruit/thrill.  Lungs: chest clear, no wheezing, rales, normal symmetric air entry  X-ray:     XR PELVIS 1 OR 2 VW, XR HIP 2 VW RIGHT     HISTORY: Pain in right hip     COMPARISONS:  CT abdomen pelvis 7/12/2018.     TECHNIQUE: AP pelvis, AP and frog-leg right hip     FINDINGS:       Pelvis: There is no evidence of acute fracture nor dislocation. Marginal  osteophytes are identified at the hips bilaterally. There are moderate to  severe degenerative changes of the lower lumbar spine. Multiple surgical  staples are identified within the right pelvic soft tissues. There are  several calcified pelvic phleboliths. Smooth periosteal reaction along the  superior pubic ramus is symmetric. Sclerosis along the superior/medial  aspect of the femoral head is noted bilaterally, possibly due to  overlapping osseous structures. Underlying avascular necrosis is possible.     Right hip: Small marginal osteophytes along the acetabulum and femoral head  neck junction. Mild superior joint space narrowing. No discrete fracture.  Significant atherosclerosis.        IMPRESSION:   1. No acute fracture is identified.  2. Moderate osteoarthritis.  3. Subtle sclerotic areas along the superior/medial weightbearing surface  of the femoral head bilaterally, raising some concern for avascular  necrosis. This could be further evaluated with MRI, if clinically  Warranted.    CT of pelvis from 7-12-18    CT ABDOMEN:     Imaging of the lung bases demonstrate pleural-based calcifications.     The visualized osseous structures demonstrate moderate degenerative disc  disease of the thoracolumbar spine.     The liver, spleen, pancreas, and adrenal glands are unremarkable. Atrophic  bilateral native kidneys. The gallbladder is present.     The bowel is nonobstructed. Appendectomy changes.     Mild right hydroureteronephrosis of the transplant kidney which measures  11.4 cm in sagittal dimension..     CT PELVIS:     Urinary bladder displaced to  the left from right lower quadrant transplant  kidney. Uncomplicated colonic diverticulosis.        IMPRESSION:     1. Mild right hydroureteronephrosis of the transplant kidney. The  transplant kidney may impinge upon the ureteral insertion onto the urinary  bladder with leftward displacement of the urinary bladder noted.  2. No secondary signs of obstructive uropathy of the transplant kidney such  as perinephric fat stranding.  3. Appendectomy.  4. Asbestos related pleural disease.  5. Atrophic bilateral native kidneys.       family and social history sections including the medications, vitals and allergies listed in the above medical record as well as the nursing notes.  ASSESSMENT:    1. Right hip pain    2. Sciatica of right side        PLAN:  Follow up with primary care  I did offer physical therapy pt declined  Counseled differentials. Take Rx's as prescribed. For acute pain, rest, intermittent application of heat (do not sleep on heating pad) alternating with ice, analgesics and muscle relaxants are recommended. Discussed longer term treatment plan of prn NSAIDs, weight management, and muscle toning. Proper lifting with avoidance of heavy lifting discussed. Pt will follow up with primary care in 7 days if no improvement sooner if worse. Explained medications given and its side effects. patient given written copies of discharge instructions.  All questions answered and patient is agreeable to this plan.  Refer to AVS      This patient was seen in collaboration with Dr Knapp     educated

## 2019-05-28 NOTE — ED PROVIDER NOTE - NS ED MD EM SELECTION
Problem: Goal Outcome Summary  Goal: Goal Outcome Summary  Outcome: Improving  S:  Pt discharged to home  B:  Pt discharged to home at approx 1530 via wheelchair to be picked up at front door of hospital by friend.        All belongings sent w/ pt including purse, billfold and cellphone.       Pt received stroke information booklet and teaching done by [facility] on stroke and risk reduction.  MD reviewed test results with pt and discussed follow up with patient as well as monitoring needed to make sure that blood pressure is adequately and appropriately treated.  Medication prescription sent to pt home pharmacy.  Discharge instructions reviewed with pt and copy provided to patient.  A:  As noted.  R:  Cont w/ care plan.               [id number] Comprehensive

## 2019-05-28 NOTE — DISCHARGE NOTE PROVIDER - HOSPITAL COURSE
78 year old male from home with PMHx of vertigo, HTN, and HLD presented with cough and fever. CXR showed retrosternal infiltrate. Patient was admitted for community acquired pneumonia. IV antibiotics:  azithromycin and Rocephin initiated and continued. Blood culture pending. Patient feeling better, no fever, chest pain or SOB.     VS stable 78 year old male from home with PMHx of vertigo, HTN, and HLD presented with cough and fever. CXR showed retrosternal infiltrate. Patient was admitted for community acquired pneumonia. IV antibiotics:  azithromycin and Rocephin initiated and continued. Blood culture pending. Patient feeling better, no fever, chest pain or SOB.     VS stable, leucocytosis trending down. Pt anxious to be discharged today.     Patietn cleared to be discharged with recommendation to continue oral antibiotic and follow up with PCP for reevalaution to ensure resolution of symptoms 78 year old male from home with PMHx of vertigo, HTN, and HLD presented with cough and fever. CXR showed retrosternal infiltrate. Patient was admitted for community acquired pneumonia. IV antibiotics:  azithromycin and Rocephin initiated and continued. Blood culture pending. Patient feeling better, no fever, chest pain or SOB.     VS stable, leucocytosis trending down. Pt anxious to be discharged today.     Patient cleared to be discharged with recommendation to continue oral antibiotic and follow up with PCP for reevalaution to ensure resolution of symptoms             Attending addendum: Patient seen and examined. Discussed plan of care- [facility] - # [id number]    Patient feels better- no fever today. no SOB.    Improved with antibiotics    Discharge home on oral antibiotics and follow up with PCP within 3-5 days    Discussed with patients daughter over the phone.

## 2019-05-28 NOTE — H&P ADULT - PROBLEM SELECTOR PLAN 1
Patient presented with productive cough and subjective fevers for last 3 days  on admission pt was afebrile, HD stable   CBC showed leucocytosis 12 with left sift   normal electrolytes and renal functions   CXR showed retro-sternal infiltrate   UA neg   F/u blood cx and procalcitonin   likely CAP   continue with azithro and Rocephin

## 2019-05-28 NOTE — ED ADULT NURSE NOTE - ED STAT RN HANDOFF DETAILS 2
Patient received from PARTH Latham admitted to medicine in no acute distress, antibiotics to administered as order. No bed assigned as yet continue to monitor patient.

## 2019-05-28 NOTE — H&P ADULT - PROBLEM SELECTOR PLAN 4
IMPROVE VTE Individual Risk Assessment          RISK                                                          Points  [  ] Previous VTE                                                3  [  ] Thrombophilia                                             2  [  ] Lower limb paralysis                                   2        (unable to hold up >15 seconds)    [  ] Current Cancer                                             2         (within 6 months)  [x  ] Immobilization > 24 hrs                              1  [  ] ICU/CCU stay > 24 hours                             1  [ x ] Age > 60                                                         1    IMPROVE VTE Score: VTE score 2  lovenox for Dvt prophylaxis

## 2019-05-28 NOTE — H&P ADULT - ATTENDING COMMENTS
78 year old man with PMH of HTN/HLD with 3 days of fever and cough productive of brownish phlegm. Associated malaise and weakness. NO sick contact of note.     Vital Signs Last 24 Hrs  T(C): 37.2 (28 May 2019 01:50), Max: 37.3 (28 May 2019 00:02)  T(F): 98.9 (28 May 2019 01:50), Max: 99.2 (28 May 2019 00:02)  HR: 73 (28 May 2019 01:50) (73 - 85)  BP: 117/57 (28 May 2019 01:50) (117/57 - 161/77)  RR: 17 (28 May 2019 01:50) (17 - 20)  SpO2: 98% (28 May 2019 01:50) (96% - 98%)    Elderly man , pleasant, NAD AAO X 3  Not warm to touch, not dehydrated  CTA B/L RRR S1S2   Soft NTND BS +  No pedal edema                          13.1   12.59 )-----------( 168      ( 28 May 2019 00:59 )             40.0     05-28    138  |  107  |  19<H>  ----------------------------<  128<H>  4.0   |  24  |  0.72    Ca    8.2<L>      28 May 2019 00:59    TPro  7.5  /  Alb  3.6  /  TBili  0.9  /  DBili  x   /  AST  21  /  ALT  19  /  AlkPhos  79  05-28    CXR   left retrocardiac opacity     Impression  CAP with LLL involvement  - Admit to Medicine  - Blood cx /sputum cx  - Empiric antibiotics   -  Supportive care - as needed/ antitussive / antipyretic/O2   Continue home meds

## 2019-06-02 LAB
CULTURE RESULTS: SIGNIFICANT CHANGE UP
CULTURE RESULTS: SIGNIFICANT CHANGE UP
SPECIMEN SOURCE: SIGNIFICANT CHANGE UP
SPECIMEN SOURCE: SIGNIFICANT CHANGE UP

## 2020-03-22 ENCOUNTER — INPATIENT (INPATIENT)
Facility: HOSPITAL | Age: 80
LOS: 2 days | Discharge: HOME | DRG: 193 | End: 2020-03-25
Attending: GENERAL PRACTICE | Admitting: GENERAL PRACTICE
Payer: COMMERCIAL

## 2020-03-22 VITALS
RESPIRATION RATE: 18 BRPM | SYSTOLIC BLOOD PRESSURE: 144 MMHG | HEART RATE: 96 BPM | OXYGEN SATURATION: 95 % | HEIGHT: 60 IN | DIASTOLIC BLOOD PRESSURE: 67 MMHG | TEMPERATURE: 102 F

## 2020-03-22 DIAGNOSIS — J18.9 PNEUMONIA, UNSPECIFIED ORGANISM: ICD-10-CM

## 2020-03-22 DIAGNOSIS — I10 ESSENTIAL (PRIMARY) HYPERTENSION: ICD-10-CM

## 2020-03-22 DIAGNOSIS — E78.5 HYPERLIPIDEMIA, UNSPECIFIED: ICD-10-CM

## 2020-03-22 DIAGNOSIS — Z29.9 ENCOUNTER FOR PROPHYLACTIC MEASURES, UNSPECIFIED: ICD-10-CM

## 2020-03-22 LAB
ALBUMIN SERPL ELPH-MCNC: 3.3 G/DL — LOW (ref 3.5–5)
ALP SERPL-CCNC: 75 U/L — SIGNIFICANT CHANGE UP (ref 40–120)
ALT FLD-CCNC: 24 U/L DA — SIGNIFICANT CHANGE UP (ref 10–60)
ANION GAP SERPL CALC-SCNC: 8 MMOL/L — SIGNIFICANT CHANGE UP (ref 5–17)
APTT BLD: 35.6 SEC — SIGNIFICANT CHANGE UP (ref 27.5–36.3)
AST SERPL-CCNC: 30 U/L — SIGNIFICANT CHANGE UP (ref 10–40)
BASOPHILS # BLD AUTO: 0 K/UL — SIGNIFICANT CHANGE UP (ref 0–0.2)
BASOPHILS NFR BLD AUTO: 0 % — SIGNIFICANT CHANGE UP (ref 0–2)
BILIRUB SERPL-MCNC: 1.3 MG/DL — HIGH (ref 0.2–1.2)
BUN SERPL-MCNC: 12 MG/DL — SIGNIFICANT CHANGE UP (ref 7–18)
CALCIUM SERPL-MCNC: 8.4 MG/DL — SIGNIFICANT CHANGE UP (ref 8.4–10.5)
CHLORIDE SERPL-SCNC: 101 MMOL/L — SIGNIFICANT CHANGE UP (ref 96–108)
CO2 SERPL-SCNC: 26 MMOL/L — SIGNIFICANT CHANGE UP (ref 22–31)
CREAT SERPL-MCNC: 0.97 MG/DL — SIGNIFICANT CHANGE UP (ref 0.5–1.3)
EOSINOPHIL # BLD AUTO: 0 K/UL — SIGNIFICANT CHANGE UP (ref 0–0.5)
EOSINOPHIL NFR BLD AUTO: 0 % — SIGNIFICANT CHANGE UP (ref 0–6)
FLU A RESULT: SIGNIFICANT CHANGE UP
FLU A RESULT: SIGNIFICANT CHANGE UP
FLUAV AG NPH QL: SIGNIFICANT CHANGE UP
FLUBV AG NPH QL: SIGNIFICANT CHANGE UP
GLUCOSE SERPL-MCNC: 101 MG/DL — HIGH (ref 70–99)
HCT VFR BLD CALC: 38.5 % — LOW (ref 39–50)
HGB BLD-MCNC: 12.8 G/DL — LOW (ref 13–17)
INR BLD: 1.34 RATIO — HIGH (ref 0.88–1.16)
LACTATE SERPL-SCNC: 1.6 MMOL/L — SIGNIFICANT CHANGE UP (ref 0.7–2)
LYMPHOCYTES # BLD AUTO: 0.35 K/UL — LOW (ref 1–3.3)
LYMPHOCYTES # BLD AUTO: 3 % — LOW (ref 13–44)
MCHC RBC-ENTMCNC: 31.1 PG — SIGNIFICANT CHANGE UP (ref 27–34)
MCHC RBC-ENTMCNC: 33.2 GM/DL — SIGNIFICANT CHANGE UP (ref 32–36)
MCV RBC AUTO: 93.7 FL — SIGNIFICANT CHANGE UP (ref 80–100)
MONOCYTES # BLD AUTO: 0.12 K/UL — SIGNIFICANT CHANGE UP (ref 0–0.9)
MONOCYTES NFR BLD AUTO: 1 % — LOW (ref 2–14)
NEUTROPHILS # BLD AUTO: 11.09 K/UL — HIGH (ref 1.8–7.4)
NEUTROPHILS NFR BLD AUTO: 94 % — HIGH (ref 43–77)
PLATELET # BLD AUTO: 124 K/UL — LOW (ref 150–400)
POTASSIUM SERPL-MCNC: 3.7 MMOL/L — SIGNIFICANT CHANGE UP (ref 3.5–5.3)
POTASSIUM SERPL-SCNC: 3.7 MMOL/L — SIGNIFICANT CHANGE UP (ref 3.5–5.3)
PROT SERPL-MCNC: 7.5 G/DL — SIGNIFICANT CHANGE UP (ref 6–8.3)
PROTHROM AB SERPL-ACNC: 15.3 SEC — HIGH (ref 10–12.9)
RBC # BLD: 4.11 M/UL — LOW (ref 4.2–5.8)
RBC # FLD: 13.5 % — SIGNIFICANT CHANGE UP (ref 10.3–14.5)
RSV RESULT: SIGNIFICANT CHANGE UP
RSV RNA RESP QL NAA+PROBE: SIGNIFICANT CHANGE UP
SODIUM SERPL-SCNC: 135 MMOL/L — SIGNIFICANT CHANGE UP (ref 135–145)
WBC # BLD: 11.55 K/UL — HIGH (ref 3.8–10.5)
WBC # FLD AUTO: 11.55 K/UL — HIGH (ref 3.8–10.5)

## 2020-03-22 PROCEDURE — 71045 X-RAY EXAM CHEST 1 VIEW: CPT | Mod: 26

## 2020-03-22 PROCEDURE — 99291 CRITICAL CARE FIRST HOUR: CPT

## 2020-03-22 RX ORDER — ALBUTEROL 90 UG/1
1 AEROSOL, METERED ORAL EVERY 4 HOURS
Refills: 0 | Status: DISCONTINUED | OUTPATIENT
Start: 2020-03-22 | End: 2020-03-22

## 2020-03-22 RX ORDER — ACETAMINOPHEN 500 MG
650 TABLET ORAL EVERY 6 HOURS
Refills: 0 | Status: DISCONTINUED | OUTPATIENT
Start: 2020-03-22 | End: 2020-03-25

## 2020-03-22 RX ORDER — SIMVASTATIN 20 MG/1
20 TABLET, FILM COATED ORAL AT BEDTIME
Refills: 0 | Status: DISCONTINUED | OUTPATIENT
Start: 2020-03-22 | End: 2020-03-24

## 2020-03-22 RX ORDER — AZITHROMYCIN 500 MG/1
500 TABLET, FILM COATED ORAL EVERY 24 HOURS
Refills: 0 | Status: DISCONTINUED | OUTPATIENT
Start: 2020-03-22 | End: 2020-03-22

## 2020-03-22 RX ORDER — ENOXAPARIN SODIUM 100 MG/ML
40 INJECTION SUBCUTANEOUS DAILY
Refills: 0 | Status: DISCONTINUED | OUTPATIENT
Start: 2020-03-22 | End: 2020-03-24

## 2020-03-22 RX ORDER — ALBUTEROL 90 UG/1
2 AEROSOL, METERED ORAL EVERY 6 HOURS
Refills: 0 | Status: DISCONTINUED | OUTPATIENT
Start: 2020-03-22 | End: 2020-03-25

## 2020-03-22 RX ORDER — SODIUM CHLORIDE 9 MG/ML
1000 INJECTION INTRAMUSCULAR; INTRAVENOUS; SUBCUTANEOUS
Refills: 0 | Status: DISCONTINUED | OUTPATIENT
Start: 2020-03-22 | End: 2020-03-24

## 2020-03-22 RX ORDER — ASCORBIC ACID 60 MG
500 TABLET,CHEWABLE ORAL
Refills: 0 | Status: DISCONTINUED | OUTPATIENT
Start: 2020-03-22 | End: 2020-03-25

## 2020-03-22 RX ORDER — CEFEPIME 1 G/1
2000 INJECTION, POWDER, FOR SOLUTION INTRAMUSCULAR; INTRAVENOUS ONCE
Refills: 0 | Status: COMPLETED | OUTPATIENT
Start: 2020-03-22 | End: 2020-03-22

## 2020-03-22 RX ORDER — ACETAMINOPHEN 500 MG
650 TABLET ORAL ONCE
Refills: 0 | Status: COMPLETED | OUTPATIENT
Start: 2020-03-22 | End: 2020-03-22

## 2020-03-22 RX ORDER — SODIUM CHLORIDE 9 MG/ML
1850 INJECTION INTRAMUSCULAR; INTRAVENOUS; SUBCUTANEOUS ONCE
Refills: 0 | Status: COMPLETED | OUTPATIENT
Start: 2020-03-22 | End: 2020-03-22

## 2020-03-22 RX ADMIN — SIMVASTATIN 20 MILLIGRAM(S): 20 TABLET, FILM COATED ORAL at 22:17

## 2020-03-22 RX ADMIN — SODIUM CHLORIDE 1850 MILLILITER(S): 9 INJECTION INTRAMUSCULAR; INTRAVENOUS; SUBCUTANEOUS at 15:58

## 2020-03-22 RX ADMIN — Medication 650 MILLIGRAM(S): at 16:07

## 2020-03-22 RX ADMIN — Medication 200 MILLIGRAM(S): at 20:18

## 2020-03-22 RX ADMIN — Medication 650 MILLIGRAM(S): at 15:07

## 2020-03-22 RX ADMIN — SODIUM CHLORIDE 1850 MILLILITER(S): 9 INJECTION INTRAMUSCULAR; INTRAVENOUS; SUBCUTANEOUS at 15:06

## 2020-03-22 RX ADMIN — CEFEPIME 100 MILLIGRAM(S): 1 INJECTION, POWDER, FOR SOLUTION INTRAMUSCULAR; INTRAVENOUS at 15:57

## 2020-03-22 NOTE — ED ADULT NURSE NOTE - INTERVENTIONS DEFINITIONS
Provide visual clues: red socks/Call bell, personal items and telephone within reach/Stretcher in lowest position, wheels locked, appropriate side rails in place/Instruct patient to call for assistance/Provide visual cue, wrist band, yellow gown, etc.

## 2020-03-22 NOTE — H&P ADULT - NSHPPHYSICALEXAM_GEN_ALL_CORE
T(C): 37.3 (03-22-20 @ 19:11), Max: 38.8 (03-22-20 @ 12:58)  HR: 89 (03-22-20 @ 19:11) (86 - 99)  BP: 132/52 (03-22-20 @ 19:11) (117/47 - 144/67)  BP(mean): --  RR: 18 (03-22-20 @ 19:11) (18 - 18)  SpO2: 98% (03-22-20 @ 19:11) (95% - 98%)

## 2020-03-22 NOTE — ED PROVIDER NOTE - PHYSICAL EXAMINATION
Febrile, hemodynamically stable, saturating well on RA  NAD, well appearing, walking around independently  Head NCAT, neck supple/full ROM  Pupils equal, EOMI grossly, anicteric  MMM  No JVD  RRR, nml S1/S2, no m/r/g  Lungs CTAB, no w/r/r  Abd soft, NT, ND, nml BS, no rebound or guarding  AAO, CN's 3-12 grossly intact  CLARK spontaneously, no leg cyanosis or edema  Skin warm, dry, no rashes or hives

## 2020-03-22 NOTE — H&P ADULT - ASSESSMENT
Patient is an 79 year old male from home alert and oriented x2-3, with PMH of HTN and HLD, who comes in to the ED for fevers, cough and muscles aches.    Mild leukocytosis of 11.55 on admission. CXR showed bilateral airspace opacities. Given dose of cefepime and levaquin in the ED. Given bolus of 1850cc NS.     Admitted for concern for COVID.

## 2020-03-22 NOTE — ED PROVIDER NOTE - CLINICAL SUMMARY MEDICAL DECISION MAKING FREE TEXT BOX
No meningeal signs, e/o intraabdominal process, cellulitis, or UTI. No e/o fluid overload or signs of DVT or PE. No meningeal signs, e/o intraabdominal process, cellulitis, or UTI. No e/o fluid overload or signs of DVT or PE. Character c/w PNA, confirmed on CXR. COVID also of concern and testing sent. Patient well appearing, hemodynamically stable, no WOB or desats. Admitted to internal medicine for further monitoring, w/u, and care.

## 2020-03-22 NOTE — H&P ADULT - HISTORY OF PRESENT ILLNESS
Patient is an 79 year old Patient is an 79 year old male from home alert and oriented x2-3, with PMH of HTN and HLD, who comes in to the ED for fevers, cough and muscles aches. Most of history obtained from grandson. As per grandson, patient had fever and also complained of feeling dizziness. Also states that the patient is having poor appetite. Patient only complains of dizziness. Denies any shortness of breath or chest pain. No nausea, vomiting, diarrhea or constipation. As per grandson, patient has had no recent travels or sick contacts. Patient is an 79 year old male from home alert and oriented x2-3, with PMH of HTN and HLD, who comes in to the ED for fevers, cough and muscles aches.   Most of history obtained from grandson. As per grandson, patient had fever and also complained of feeling dizziness.   Also states that the patient is having poor appetite. Patient only complains of dizziness.   Denies any shortness of breath or chest pain. No nausea, vomiting, diarrhea or constipation. As per grandson, patient has had no recent travels or sick contacts.

## 2020-03-22 NOTE — ED PROVIDER NOTE - CRITICAL CARE PROVIDED
documentation/consult w/ pt's family directly relating to pts condition/interpretation of diagnostic studies/direct patient care (not related to procedure)/additional history taking

## 2020-03-22 NOTE — ED PROVIDER NOTE - OBJECTIVE STATEMENT
79yoM with h/o HTN, HLD, presents with daughter c/o muscle aches, fever, and productive cough since yesterday. Associated report of hallucinations. Yesterday seen by PMD and given something for ?bronchitis.

## 2020-03-22 NOTE — ED ADULT NURSE NOTE - ED STAT RN HANDOFF DETAILS
Report given to PARTH Daniel. Pt resting in bed, no acute distress noted, denies chest pain, no shortness of breath indicated. Safety maintained.

## 2020-03-22 NOTE — H&P ADULT - ATTENDING COMMENTS
Patient seen, examined, and interviewed by me, case discussed with me, chart reviewed, agree with above H/P as reviewed and edited by me.  See  full note above.  Dr. BENSON Caro ([phone])

## 2020-03-22 NOTE — H&P ADULT - NSHPSOCIALHISTORY_GEN_ALL_CORE
Patient lives at home with his daughters, grandchildren and great grandchildren. As per grandson, patient does not smoke, drink alcohol or use illicit drugs.

## 2020-03-22 NOTE — H&P ADULT - PROBLEM SELECTOR PLAN 4
IMPROVE VTE Individual Risk Assessment  RISK                                                                Points  [  ] Previous VTE                                                  3  [  ] Thrombophilia                                               2  [  ] Lower limb paralysis                                      2        (unable to hold up >15 seconds)    [  ] Current Cancer                                              2        (within 6 months)  [  ] Immobilization > 24 hrs                                1  [  ] ICU/CCU stay > 24 hours                              1  [X ] Age > 60                                                      1    IMPROVE VTE Score 1    lovenox for DVT ppx

## 2020-03-22 NOTE — ED ADULT NURSE NOTE - OBJECTIVE STATEMENT
Patient presets with c/o abd pain no N/V/D fever noted, disoriented on place time and situation, restless at times room alert and yellow gown in place.

## 2020-03-22 NOTE — H&P ADULT - PROBLEM SELECTOR PLAN 1
patient presented to the ED complaining of fever and cough  CXR showed bilateral airspace opacities  given dose of cefepime and levaquin in ED  continue levaquin for now   f/u blood cx  COVID test ordered by ED  monitor O2 sat  albuterol as needed patient presented to the ED complaining of fever and cough  CXR showed bilateral airspace opacities  likely metabolic encephalopathy due to infection   given dose of cefepime and levaquin in ED  continue levaquin for now   f/u blood cx  COVID test ordered by ED  monitor O2 sat  albuterol as needed

## 2020-03-23 DIAGNOSIS — G93.40 ENCEPHALOPATHY, UNSPECIFIED: ICD-10-CM

## 2020-03-23 DIAGNOSIS — R74.0 NONSPECIFIC ELEVATION OF LEVELS OF TRANSAMINASE AND LACTIC ACID DEHYDROGENASE [LDH]: ICD-10-CM

## 2020-03-23 LAB
24R-OH-CALCIDIOL SERPL-MCNC: 16.8 NG/ML — LOW (ref 30–80)
ALBUMIN SERPL ELPH-MCNC: 2.9 G/DL — LOW (ref 3.5–5)
ALP SERPL-CCNC: 70 U/L — SIGNIFICANT CHANGE UP (ref 40–120)
ALT FLD-CCNC: 25 U/L DA — SIGNIFICANT CHANGE UP (ref 10–60)
ANION GAP SERPL CALC-SCNC: 7 MMOL/L — SIGNIFICANT CHANGE UP (ref 5–17)
APPEARANCE UR: CLEAR — SIGNIFICANT CHANGE UP
AST SERPL-CCNC: 42 U/L — HIGH (ref 10–40)
BASOPHILS # BLD AUTO: SIGNIFICANT CHANGE UP K/UL (ref 0–0.2)
BASOPHILS NFR BLD AUTO: SIGNIFICANT CHANGE UP % (ref 0–2)
BILIRUB SERPL-MCNC: 1.3 MG/DL — HIGH (ref 0.2–1.2)
BILIRUB UR-MCNC: NEGATIVE — SIGNIFICANT CHANGE UP
BUN SERPL-MCNC: 11 MG/DL — SIGNIFICANT CHANGE UP (ref 7–18)
CALCIUM SERPL-MCNC: 8.1 MG/DL — LOW (ref 8.4–10.5)
CHLORIDE SERPL-SCNC: 103 MMOL/L — SIGNIFICANT CHANGE UP (ref 96–108)
CHOLEST SERPL-MCNC: 174 MG/DL — SIGNIFICANT CHANGE UP (ref 10–199)
CO2 SERPL-SCNC: 24 MMOL/L — SIGNIFICANT CHANGE UP (ref 22–31)
COLOR SPEC: YELLOW — SIGNIFICANT CHANGE UP
CREAT SERPL-MCNC: 0.74 MG/DL — SIGNIFICANT CHANGE UP (ref 0.5–1.3)
CRP SERPL-MCNC: 24.07 MG/DL — HIGH (ref 0–0.4)
DIFF PNL FLD: ABNORMAL
EOSINOPHIL # BLD AUTO: SIGNIFICANT CHANGE UP K/UL (ref 0–0.5)
EOSINOPHIL NFR BLD AUTO: SIGNIFICANT CHANGE UP % (ref 0–6)
ERYTHROCYTE [SEDIMENTATION RATE] IN BLOOD: 21 MM/HR — HIGH (ref 0–20)
FERRITIN SERPL-MCNC: 282 NG/ML — SIGNIFICANT CHANGE UP (ref 30–400)
GLUCOSE SERPL-MCNC: 91 MG/DL — SIGNIFICANT CHANGE UP (ref 70–99)
GLUCOSE UR QL: NEGATIVE — SIGNIFICANT CHANGE UP
HBA1C BLD-MCNC: 5.8 % — HIGH (ref 4–5.6)
HCT VFR BLD CALC: 38.8 % — LOW (ref 39–50)
HDLC SERPL-MCNC: 63 MG/DL — SIGNIFICANT CHANGE UP
HGB BLD-MCNC: 12.7 G/DL — LOW (ref 13–17)
IMM GRANULOCYTES NFR BLD AUTO: SIGNIFICANT CHANGE UP % (ref 0–1.5)
INR BLD: 1.34 RATIO — HIGH (ref 0.88–1.16)
KETONES UR-MCNC: ABNORMAL
LDH SERPL L TO P-CCNC: 220 U/L — SIGNIFICANT CHANGE UP (ref 120–225)
LEUKOCYTE ESTERASE UR-ACNC: NEGATIVE — SIGNIFICANT CHANGE UP
LIPID PNL WITH DIRECT LDL SERPL: 96 MG/DL — SIGNIFICANT CHANGE UP
LYMPHOCYTES # BLD AUTO: SIGNIFICANT CHANGE UP % (ref 13–44)
LYMPHOCYTES # BLD AUTO: SIGNIFICANT CHANGE UP K/UL (ref 1–3.3)
MAGNESIUM SERPL-MCNC: 2.1 MG/DL — SIGNIFICANT CHANGE UP (ref 1.6–2.6)
MCHC RBC-ENTMCNC: 30.5 PG — SIGNIFICANT CHANGE UP (ref 27–34)
MCHC RBC-ENTMCNC: 32.7 GM/DL — SIGNIFICANT CHANGE UP (ref 32–36)
MCV RBC AUTO: 93 FL — SIGNIFICANT CHANGE UP (ref 80–100)
MONOCYTES # BLD AUTO: SIGNIFICANT CHANGE UP K/UL (ref 0–0.9)
MONOCYTES NFR BLD AUTO: SIGNIFICANT CHANGE UP % (ref 2–14)
NEUTROPHILS # BLD AUTO: SIGNIFICANT CHANGE UP K/UL (ref 1.8–7.4)
NEUTROPHILS NFR BLD AUTO: SIGNIFICANT CHANGE UP % (ref 43–77)
NITRITE UR-MCNC: NEGATIVE — SIGNIFICANT CHANGE UP
NRBC # BLD: 0 /100 WBCS — SIGNIFICANT CHANGE UP (ref 0–0)
PH UR: 6 — SIGNIFICANT CHANGE UP (ref 5–8)
PHOSPHATE SERPL-MCNC: 2 MG/DL — LOW (ref 2.5–4.5)
PLATELET # BLD AUTO: 113 K/UL — LOW (ref 150–400)
POTASSIUM SERPL-MCNC: 3.3 MMOL/L — LOW (ref 3.5–5.3)
POTASSIUM SERPL-SCNC: 3.3 MMOL/L — LOW (ref 3.5–5.3)
PROCALCITONIN SERPL-MCNC: 2.8 NG/ML — HIGH (ref 0.02–0.1)
PROT SERPL-MCNC: 6.8 G/DL — SIGNIFICANT CHANGE UP (ref 6–8.3)
PROT UR-MCNC: 30 MG/DL
PROTHROM AB SERPL-ACNC: 15.3 SEC — HIGH (ref 10–12.9)
RBC # BLD: 4.17 M/UL — LOW (ref 4.2–5.8)
RBC # FLD: 13.5 % — SIGNIFICANT CHANGE UP (ref 10.3–14.5)
SODIUM SERPL-SCNC: 134 MMOL/L — LOW (ref 135–145)
SP GR SPEC: 1.01 — SIGNIFICANT CHANGE UP (ref 1.01–1.02)
TOTAL CHOLESTEROL/HDL RATIO MEASUREMENT: 2.8 RATIO — LOW (ref 3.4–9.6)
TRIGL SERPL-MCNC: 74 MG/DL — SIGNIFICANT CHANGE UP (ref 10–149)
TSH SERPL-MCNC: 1.69 UU/ML — SIGNIFICANT CHANGE UP (ref 0.34–4.82)
UROBILINOGEN FLD QL: NEGATIVE — SIGNIFICANT CHANGE UP
VIT B12 SERPL-MCNC: 225 PG/ML — LOW (ref 232–1245)
WBC # BLD: 9.39 K/UL — SIGNIFICANT CHANGE UP (ref 3.8–10.5)
WBC # FLD AUTO: 9.39 K/UL — SIGNIFICANT CHANGE UP (ref 3.8–10.5)

## 2020-03-23 RX ORDER — PREGABALIN 225 MG/1
1000 CAPSULE ORAL DAILY
Refills: 0 | Status: DISCONTINUED | OUTPATIENT
Start: 2020-03-23 | End: 2020-03-25

## 2020-03-23 RX ORDER — CHOLECALCIFEROL (VITAMIN D3) 125 MCG
2000 CAPSULE ORAL DAILY
Refills: 0 | Status: DISCONTINUED | OUTPATIENT
Start: 2020-03-23 | End: 2020-03-25

## 2020-03-23 RX ORDER — POTASSIUM CHLORIDE 20 MEQ
20 PACKET (EA) ORAL ONCE
Refills: 0 | Status: COMPLETED | OUTPATIENT
Start: 2020-03-23 | End: 2020-03-23

## 2020-03-23 RX ADMIN — PREGABALIN 1000 MICROGRAM(S): 225 CAPSULE ORAL at 18:05

## 2020-03-23 RX ADMIN — Medication 500 MILLIGRAM(S): at 05:47

## 2020-03-23 RX ADMIN — Medication 200 MILLIGRAM(S): at 11:05

## 2020-03-23 RX ADMIN — Medication 1000 UNIT(S): at 18:06

## 2020-03-23 RX ADMIN — SIMVASTATIN 20 MILLIGRAM(S): 20 TABLET, FILM COATED ORAL at 21:52

## 2020-03-23 RX ADMIN — Medication 200 MILLIGRAM(S): at 18:05

## 2020-03-23 RX ADMIN — SODIUM CHLORIDE 75 MILLILITER(S): 9 INJECTION INTRAMUSCULAR; INTRAVENOUS; SUBCUTANEOUS at 00:55

## 2020-03-23 RX ADMIN — Medication 200 MILLIGRAM(S): at 05:47

## 2020-03-23 RX ADMIN — Medication 200 MILLIGRAM(S): at 00:55

## 2020-03-23 RX ADMIN — Medication 20 MILLIEQUIVALENT(S): at 14:14

## 2020-03-23 RX ADMIN — ENOXAPARIN SODIUM 40 MILLIGRAM(S): 100 INJECTION SUBCUTANEOUS at 11:05

## 2020-03-23 RX ADMIN — Medication 500 MILLIGRAM(S): at 18:07

## 2020-03-23 NOTE — PROGRESS NOTE ADULT - PROBLEM SELECTOR PLAN 2
CXR results as above  SpO2 92-99% on RA  c/w Levaquin IV , DAy 2  c/w antitussive, albuterol prn  F/u COVID 19

## 2020-03-23 NOTE — DISCHARGE NOTE PROVIDER - NSDCMRMEDTOKEN_GEN_ALL_CORE_FT
acetaminophen: 250 milligram(s) orally once a day  simvastatin 20 mg oral tablet: 1 tab(s) orally once a day (at bedtime) acetaminophen 325 mg oral tablet: 2 tab(s) orally every 6 hours, As needed, Temp greater or equal to 38C (100.4F), Mild Pain (1 - 3)  albuterol 90 mcg/inh inhalation aerosol: 2 puff(s) inhaled every 6 hours, As needed, Shortness of Breath and/or Wheezing  ascorbic acid 500 mg oral tablet: 1 tab(s) orally 2 times a day  cholecalciferol 2000 intl units (50 mcg) oral tablet: 1 tab(s) orally once a day   cyanocobalamin 1000 mcg oral tablet: 1 tab(s) orally once a day  guaiFENesin 100 mg/5 mL oral liquid: 10 milliliter(s) orally every 6 hours, As Needed for cough   Levaquin 500 mg oral tablet: 1 tab(s) orally once a day acetaminophen 325 mg oral tablet: 2 tab(s) orally every 6 hours, As needed, Temp greater or equal to 38C (100.4F), Mild Pain (1 - 3)  albuterol 90 mcg/inh inhalation aerosol: 2 puff(s) inhaled every 6 hours, As needed, Shortness of Breath and/or Wheezing  ascorbic acid 500 mg oral tablet: 1 tab(s) orally 2 times a day  cholecalciferol 2000 intl units (50 mcg) oral tablet: 1 tab(s) orally once a day   cyanocobalamin 1000 mcg oral tablet: 1 tab(s) orally once a day  guaiFENesin 100 mg/5 mL oral liquid: 10 milliliter(s) orally every 6 hours, As Needed for cough   Levaquin 500 mg oral tablet: 1 tab(s) orally once a day   losartan 50 mg oral tablet: 1 tab(s) orally once a day  tamsulosin 0.4 mg oral capsule: 1 cap(s) orally once a day

## 2020-03-23 NOTE — DISCHARGE NOTE PROVIDER - NSDCFUADDINST_GEN_ALL_CORE_FT
CORONAVIRUS INSTRUCTIONS:   Based on your current clinical status and stability, it has been determined that you no longer need hospitalization and can recover while remaining in self-quarantine at home. You should follow the prevention steps below until a healthcare provider or local or state health department says you can return to your normal activities.     1. You should restrict activities outside your home, except for getting medical care.   2. Do not go to work, school, or public areas.   3. Avoid using public transportation, ride-sharing, or taxis.   4. Separate yourself from other people and animals in your home as much as possible.  When you are around other people (e.g., sharing a room or vehicle) you should wear a facemask.  5. Wash your hands often with soap and water for at least 20 seconds, especially after blowing your nose, coughing, or sneezing; going to the bathroom; and before eating or preparing food.  6. Cover your mouth and nose with a tissue when you cough or sneeze. Throw used tissues in a lined trash can. Immediately wash your hands with soap and water for at least 20 seconds  7. High touch surfaces include counters, tabletops, doorknobs, bathroom fixtures, toilets, phones, keyboards, tablets, and bedside tables.  8. Avoid sharing dishes, drinking glasses, cups, eating utensils, towels, or bedding with other people or pets in your home. After using these items, they should be washed thoroughly with soap and water.  You are strongly advised to seek prompt medical attention if your illness worsens or you develop new symptoms like fever or difficulty breathing.   Please call  [phone] to speak with your discharging physician if you have any questions.

## 2020-03-23 NOTE — PROGRESS NOTE ADULT - PROBLEM SELECTOR PLAN 1
likely due to infection , PNA with possible  COVID 19  low grade fever, leukocytosis resolved  ESR, CRP, Procalcitonin elevated,   UA neg for UTI  F/U BC   F/U COVID 19  Supportive measues  Fall precautions

## 2020-03-23 NOTE — PATIENT PROFILE ADULT - NSPROPOAURINARYCATHETER_GEN_A_NUR
Kosta Seymour is a 46 y.o. female that (in part)  has a past medical history of Asthma and Hypertension.  has a past surgical history that includes Hysterectomy; Oophorectomy; Cholecystectomy; and Ectopic pregnancy surgery. Presents to [facility] complaining of shortness of breath.  Patient was evaluated in urgent care and diagnosed with pneumonia.  She was sent home with doxycycline he did not show improvement.  She came back for further evaluation.       Description of symptoms    Location:  Chest/ lungs  Onset:  Subacute with progressive worsening  Character:  Moderate to severe shortness of breath and dyspnea with exertion  Frequency:  Daily  Duration:  Constant since onset  Associated Symptoms:  Fever, chills, cough, chest pain, wheezing  Radiation:  Throughout chest  Exacerbating factors:  Worsened with exertion  Relieving factors:  Some relief with supplemental oxygen.  No relief with doxycycline    .    In the emergency department repeat chest x-ray revealed evidence of right-sided middle lobe pneumonia.  Initially given ceftriaxone and azithromycin, however patient became flushed with the azithromycin.  Regimen switched to Levaquin.    Hospital medicine has been asked to admit for further evaluation and treatment.   
no

## 2020-03-23 NOTE — DISCHARGE NOTE PROVIDER - NSDCCAREPROVSEEN_GEN_ALL_CORE_FT
Jada Caro Steven Mitchell Doctor  Emilia Felipe  Team [facility] ACP - NP Service  Johanna Lobo [facility]  Sharon Mccallum

## 2020-03-23 NOTE — DISCHARGE NOTE PROVIDER - NSDCCPCAREPLAN_GEN_ALL_CORE_FT
PRINCIPAL DISCHARGE DIAGNOSIS  Diagnosis: Multifocal pneumonia  Assessment and Plan of Treatment: You were admitted with pneumonia due to covid 19. You were also treated with antibiotics to protect you from bacterial infection. Please complete three more days of antibotics. You are stable for discharge. Seek medical attention if you experience shortness of breath or worsening of your symptoms.      SECONDARY DISCHARGE DIAGNOSES  Diagnosis: Coronavirus infection  Assessment and Plan of Treatment: You were found to be positive for Covid 19. Please follow the instructions below.  Please stay quarentined for 14 days.    Diagnosis: Vitamin D deficiency  Assessment and Plan of Treatment: Your vitamin D level is low. Please continue taking a supplement and follow up with your primary care doctor. Please also take a calcium supplement.    Diagnosis: Vitamin B12 deficiency  Assessment and Plan of Treatment: Your vitamin b12 is low. Please continue taking a supplement and follow up with your primary care doctor.    Diagnosis: Transaminitis  Assessment and Plan of Treatment: You were found to have elevated liver enzymes due to the virus. Your simvastatin is being held. Please follow up with your primary care doctor once you are completely recovered from covid to have follow up labwork and restart your simvastatin.

## 2020-03-23 NOTE — PROGRESS NOTE ADULT - SUBJECTIVE AND OBJECTIVE BOX
NP Note discussed with  Primary Attending    Patient is a 79y old  Male who presents with a chief complaint of fever and cough (23 Mar 2020 13:08)  HPI     79 year old male from home alert and oriented x2-3, with PMH of HTN and HLD, who presented to the ED  for fevers, cough and muscles aches.  Flu AB/RSV not detected. CXR shows bilat PNA for which pt received Cefepime and Levaquin in ED. COVID 19 result pending. Pt currently on Levaquin IV, DAy 2        INTERVAL HPI/OVERNIGHT EVENTS: Pt seen and examined this morning. Pt awake, reports feeling well, denies SOB, chest pain.   This evening RN reports pt is attempting to get OOB and elope. Pt reoriented and redirected. #[id number] assisted.    MEDICATIONS  (STANDING):  ascorbic acid 500 milliGRAM(s) Oral two times a day  cholecalciferol 2000 Unit(s) Oral daily  cyanocobalamin 1000 MICROGram(s) Oral daily  enoxaparin Injectable 40 milliGRAM(s) SubCutaneous daily  guaiFENesin   Syrup  (Sugar-Free) 200 milliGRAM(s) Oral every 6 hours  levoFLOXacin IVPB 500 milliGRAM(s) IV Intermittent every 24 hours  simvastatin 20 milliGRAM(s) Oral at bedtime  sodium chloride 0.9%. 1000 milliLiter(s) (75 mL/Hr) IV Continuous <Continuous>    MEDICATIONS  (PRN):  acetaminophen   Tablet .. 650 milliGRAM(s) Oral every 6 hours PRN Temp greater or equal to 38C (100.4F), Mild Pain (1 - 3)  ALBUTerol    90 MICROgram(s) HFA Inhaler 2 Puff(s) Inhalation every 6 hours PRN Shortness of Breath and/or Wheezing      __________________________________________________  REVIEW OF SYSTEMS:    CONSTITUTIONAL: No fever,   EYES: no acute visual disturbances  NECK: No pain or stiffness  RESPIRATORY: No cough; No shortness of breath  CARDIOVASCULAR: No chest pain, no palpitations  GASTROINTESTINAL: No pain. No nausea or vomiting; No diarrhea   NEUROLOGICAL: No headache or numbness, no tremors  MUSCULOSKELETAL: No joint pain, no muscle pain  GENITOURINARY: no dysuria, no frequency, no hesitancy  PSYCHIATRY: no depression , no anxiety  ALL OTHER  ROS negative        Vital Signs Last 24 Hrs  T(C): 37.3 (23 Mar 2020 16:16), Max: 37.9 (23 Mar 2020 00:34)  T(F): 99.1 (23 Mar 2020 16:16), Max: 100.3 (23 Mar 2020 00:34)  HR: 73 (23 Mar 2020 16:16) (1 - 88)  BP: 138/59 (23 Mar 2020 16:16) (128/61 - 157/58)  BP(mean): --  RR: 18 (23 Mar 2020 16:16) (17 - 18)  SpO2: 99% (23 Mar 2020 16:16) (92% - 99%)    ________________________________________________  PHYSICAL EXAM:  GENERAL: NAD  HEENT: Normocephalic;  conjunctivae and sclerae clear; moist mucous membranes;   NECK : supple  CHEST/LUNG: Clear to auscultation bilaterally with good air entry   HEART: S1 S2  regular; no murmurs, gallops or rubs  ABDOMEN: Soft, Nontender, Nondistended; Bowel sounds present  EXTREMITIES: no cyanosis; no edema; no calf tenderness  SKIN: warm and dry; no rash  NERVOUS SYSTEM:  Awake and alert; Oriented  to person ; no new deficits    _________________________________________________  LABS:                        12.7   9.39  )-----------( 113      ( 23 Mar 2020 06:48 )             38.8     03-23    134<L>  |  103  |  11  ----------------------------<  91  3.3<L>   |  24  |  0.74    Ca    8.1<L>      23 Mar 2020 06:48  Phos  2.0     03-  Mg     2.1     03-23    TPro  6.8  /  Alb  2.9<L>  /  TBili  1.3<H>  /  DBili  x   /  AST  42<H>  /  ALT  25  /  AlkPhos  70  03-23    PT/INR - ( 23 Mar 2020 06:48 )   PT: 15.3 sec;   INR: 1.34 ratio         PTT - ( 22 Mar 2020 15:06 )  PTT:35.6 sec  Urinalysis Basic - ( 23 Mar 2020 02:54 )    Color: Yellow / Appearance: Clear / S.015 / pH: x  Gluc: x / Ketone: Trace  / Bili: Negative / Urobili: Negative   Blood: x / Protein: 30 mg/dL / Nitrite: Negative   Leuk Esterase: Negative / RBC: 2-5 /HPF / WBC 0-2 /HPF   Sq Epi: x / Non Sq Epi: Few /HPF / Bacteria: Few /HPF      CAPILLARY BLOOD GLUCOSE      -----------------    FLU A B RSV Detection by PCR (20 @ 19:32)    Flu A Result: [facility]: The Flu A B RSV assay is a Real-Time PCR test for the qualitative  detection and differentiation of Influenza A, Influenza B, and  Respiratory Syncytial Virus on nasopharyngeal swabs. The results should  be interpreted in the context of all clinical and laboratory findings.    Flu B Result: [facility]    RSV Result: NotDete          RADIOLOGY & ADDITIONAL TESTS:    < from: Xray Chest 1 View-PORTABLE IMMEDIATE (20 @ 16:28) >  IMPRESSION:   Bilateral airspace opacities, worrisome for infectious infiltrates..      < end of copied text >    Consultant(s) Notes Reviewed:   YES/ No    Care Discussed with Consultants :     Plan of care was discussed with patient and /or primary care giver; all questions and concerns were addressed and care was aligned with patient's wishes.

## 2020-03-23 NOTE — DISCHARGE NOTE PROVIDER - HOSPITAL COURSE
Patient is an 79 year old male from home alert and oriented x2-3, with PMH of HTN and HLD, who comes in to the ED for fevers, cough and muscles aches. In ED,  CXR showed bilateral airspace opacities. Given dose of cefepime and levaquin in the ED and IV bolus of 1850cc NS. Pt was admitted to medicine for concern for COVID.             >>>>>>>>>>>>>>>>>>INCOMPLETE>>>>>>>>>>>>>>>>>>>>>>>>>>>>>>> 79 year old male from home alert and oriented x2-3, with PMH of HTN and HLD, who comes in to the ED for fevers, cough and muscles aches. In ED,  CXR showed bilateral airspace opacities. Given dose of cefepime and levaquin in the ED and IV bolus of 1850cc NS. Pt was admitted to medicine for pneumonia, concern for COVID. Found to be positive Covid 19. Treated with IV antibiotics, Will complete 7 day course with oral antibiotics. Also with transaminitis, likely secondary to covid. ESR, C - reactive and procalcitonin elevated. Vitamin D low and vitamin b12 low, supplements added. Pt with hyponatremia, fluids restricted, now resolved.         Pt is stable for discharge home.          CORONAVIRUS INSTRUCTIONS:     Based on your current clinical status and stability, it has been determined that you no longer need hospitalization and can recover while remaining in self-quarantine at home. You should follow the prevention steps below until a healthcare provider or local or state health department says you can return to your normal activities.         1. You should restrict activities outside your home, except for getting medical care.     2. Do not go to work, school, or public areas.     3. Avoid using public transportation, ride-sharing, or taxis.     4. Separate yourself from other people and animals in your home as much as possible.  When you are around other people (e.g., sharing a room or vehicle) you should wear a facemask.    5. Wash your hands often with soap and water for at least 20 seconds, especially after blowing your nose, coughing, or sneezing; going to the bathroom; and before eating or preparing food.    6. Cover your mouth and nose with a tissue when you cough or sneeze. Throw used tissues in a lined trash can. Immediately wash your hands with soap and water for at least 20 seconds    7. High touch surfaces include counters, tabletops, doorknobs, bathroom fixtures, toilets, phones, keyboards, tablets, and bedside tables.    8. Avoid sharing dishes, drinking glasses, cups, eating utensils, towels, or bedding with other people or pets in your home. After using these items, they should be washed thoroughly with soap and water.    You are strongly advised to seek prompt medical attention if your illness worsens or you develop new symptoms like fever or difficulty breathing.     Please call  [phone] to speak with your discharging physician if you have any questions.

## 2020-03-24 DIAGNOSIS — E87.1 HYPO-OSMOLALITY AND HYPONATREMIA: ICD-10-CM

## 2020-03-24 DIAGNOSIS — Z02.9 ENCOUNTER FOR ADMINISTRATIVE EXAMINATIONS, UNSPECIFIED: ICD-10-CM

## 2020-03-24 LAB
ALBUMIN SERPL ELPH-MCNC: 2.6 G/DL — LOW (ref 3.5–5)
ALBUMIN SERPL ELPH-MCNC: 2.8 G/DL — LOW (ref 3.5–5)
ALP SERPL-CCNC: 68 U/L — SIGNIFICANT CHANGE UP (ref 40–120)
ALP SERPL-CCNC: 73 U/L — SIGNIFICANT CHANGE UP (ref 40–120)
ALT FLD-CCNC: 26 U/L DA — SIGNIFICANT CHANGE UP (ref 10–60)
ALT FLD-CCNC: 32 U/L DA — SIGNIFICANT CHANGE UP (ref 10–60)
ANION GAP SERPL CALC-SCNC: 10 MMOL/L — SIGNIFICANT CHANGE UP (ref 5–17)
ANION GAP SERPL CALC-SCNC: 6 MMOL/L — SIGNIFICANT CHANGE UP (ref 5–17)
AST SERPL-CCNC: 56 U/L — HIGH (ref 10–40)
AST SERPL-CCNC: 58 U/L — HIGH (ref 10–40)
BASOPHILS # BLD AUTO: 0 K/UL — SIGNIFICANT CHANGE UP (ref 0–0.2)
BASOPHILS NFR BLD AUTO: 0 % — SIGNIFICANT CHANGE UP (ref 0–2)
BILIRUB SERPL-MCNC: 0.7 MG/DL — SIGNIFICANT CHANGE UP (ref 0.2–1.2)
BILIRUB SERPL-MCNC: 0.7 MG/DL — SIGNIFICANT CHANGE UP (ref 0.2–1.2)
BUN SERPL-MCNC: 10 MG/DL — SIGNIFICANT CHANGE UP (ref 7–18)
BUN SERPL-MCNC: 11 MG/DL — SIGNIFICANT CHANGE UP (ref 7–18)
CALCIUM SERPL-MCNC: 8.3 MG/DL — LOW (ref 8.4–10.5)
CALCIUM SERPL-MCNC: 8.5 MG/DL — SIGNIFICANT CHANGE UP (ref 8.4–10.5)
CHLORIDE SERPL-SCNC: 101 MMOL/L — SIGNIFICANT CHANGE UP (ref 96–108)
CHLORIDE SERPL-SCNC: 102 MMOL/L — SIGNIFICANT CHANGE UP (ref 96–108)
CO2 SERPL-SCNC: 22 MMOL/L — SIGNIFICANT CHANGE UP (ref 22–31)
CO2 SERPL-SCNC: 26 MMOL/L — SIGNIFICANT CHANGE UP (ref 22–31)
CREAT SERPL-MCNC: 0.72 MG/DL — SIGNIFICANT CHANGE UP (ref 0.5–1.3)
CREAT SERPL-MCNC: 0.78 MG/DL — SIGNIFICANT CHANGE UP (ref 0.5–1.3)
CULTURE RESULTS: NO GROWTH — SIGNIFICANT CHANGE UP
EOSINOPHIL # BLD AUTO: 0.12 K/UL — SIGNIFICANT CHANGE UP (ref 0–0.5)
EOSINOPHIL NFR BLD AUTO: 2 % — SIGNIFICANT CHANGE UP (ref 0–6)
GLUCOSE SERPL-MCNC: 124 MG/DL — HIGH (ref 70–99)
GLUCOSE SERPL-MCNC: 177 MG/DL — HIGH (ref 70–99)
HCT VFR BLD CALC: 39.2 % — SIGNIFICANT CHANGE UP (ref 39–50)
HGB BLD-MCNC: 13.4 G/DL — SIGNIFICANT CHANGE UP (ref 13–17)
IMM GRANULOCYTES NFR BLD AUTO: 0.3 % — SIGNIFICANT CHANGE UP (ref 0–1.5)
LYMPHOCYTES # BLD AUTO: 0.52 K/UL — LOW (ref 1–3.3)
LYMPHOCYTES # BLD AUTO: 8.8 % — LOW (ref 13–44)
MCHC RBC-ENTMCNC: 31 PG — SIGNIFICANT CHANGE UP (ref 27–34)
MCHC RBC-ENTMCNC: 34.2 GM/DL — SIGNIFICANT CHANGE UP (ref 32–36)
MCV RBC AUTO: 90.7 FL — SIGNIFICANT CHANGE UP (ref 80–100)
MONOCYTES # BLD AUTO: 0.25 K/UL — SIGNIFICANT CHANGE UP (ref 0–0.9)
MONOCYTES NFR BLD AUTO: 4.2 % — SIGNIFICANT CHANGE UP (ref 2–14)
NEUTROPHILS # BLD AUTO: 5 K/UL — SIGNIFICANT CHANGE UP (ref 1.8–7.4)
NEUTROPHILS NFR BLD AUTO: 84.7 % — HIGH (ref 43–77)
NRBC # BLD: 0 /100 WBCS — SIGNIFICANT CHANGE UP (ref 0–0)
PLATELET # BLD AUTO: 109 K/UL — LOW (ref 150–400)
POTASSIUM SERPL-MCNC: 3.3 MMOL/L — LOW (ref 3.5–5.3)
POTASSIUM SERPL-MCNC: 3.9 MMOL/L — SIGNIFICANT CHANGE UP (ref 3.5–5.3)
POTASSIUM SERPL-SCNC: 3.3 MMOL/L — LOW (ref 3.5–5.3)
POTASSIUM SERPL-SCNC: 3.9 MMOL/L — SIGNIFICANT CHANGE UP (ref 3.5–5.3)
PROT SERPL-MCNC: 6.6 G/DL — SIGNIFICANT CHANGE UP (ref 6–8.3)
PROT SERPL-MCNC: 7.1 G/DL — SIGNIFICANT CHANGE UP (ref 6–8.3)
RBC # BLD: 4.32 M/UL — SIGNIFICANT CHANGE UP (ref 4.2–5.8)
RBC # FLD: 13.2 % — SIGNIFICANT CHANGE UP (ref 10.3–14.5)
SARS-COV-2 RNA SPEC QL NAA+PROBE: DETECTED
SODIUM SERPL-SCNC: 133 MMOL/L — LOW (ref 135–145)
SODIUM SERPL-SCNC: 134 MMOL/L — LOW (ref 135–145)
SPECIMEN SOURCE: SIGNIFICANT CHANGE UP
WBC # BLD: 5.91 K/UL — SIGNIFICANT CHANGE UP (ref 3.8–10.5)
WBC # FLD AUTO: 5.91 K/UL — SIGNIFICANT CHANGE UP (ref 3.8–10.5)

## 2020-03-24 RX ORDER — POTASSIUM CHLORIDE 20 MEQ
40 PACKET (EA) ORAL ONCE
Refills: 0 | Status: COMPLETED | OUTPATIENT
Start: 2020-03-24 | End: 2020-03-24

## 2020-03-24 RX ORDER — SODIUM CHLORIDE 9 MG/ML
1 INJECTION INTRAMUSCULAR; INTRAVENOUS; SUBCUTANEOUS ONCE
Refills: 0 | Status: COMPLETED | OUTPATIENT
Start: 2020-03-24 | End: 2020-03-24

## 2020-03-24 RX ADMIN — ENOXAPARIN SODIUM 40 MILLIGRAM(S): 100 INJECTION SUBCUTANEOUS at 11:21

## 2020-03-24 RX ADMIN — Medication 40 MILLIEQUIVALENT(S): at 14:19

## 2020-03-24 RX ADMIN — Medication 200 MILLIGRAM(S): at 11:22

## 2020-03-24 RX ADMIN — Medication 2000 UNIT(S): at 11:21

## 2020-03-24 RX ADMIN — SODIUM CHLORIDE 1 GRAM(S): 9 INJECTION INTRAMUSCULAR; INTRAVENOUS; SUBCUTANEOUS at 14:18

## 2020-03-24 RX ADMIN — Medication 500 MILLIGRAM(S): at 17:32

## 2020-03-24 RX ADMIN — PREGABALIN 1000 MICROGRAM(S): 225 CAPSULE ORAL at 14:16

## 2020-03-24 RX ADMIN — Medication 200 MILLIGRAM(S): at 17:33

## 2020-03-24 NOTE — PROGRESS NOTE ADULT - SUBJECTIVE AND OBJECTIVE BOX
NP Note discussed with  primary attending    Patient is a 79y old  Male who presents with a chief complaint of fever and cough (24 Mar 2020 09:37)      INTERVAL HPI/OVERNIGHT EVENTS: no new complaints    MEDICATIONS  (STANDING):  ascorbic acid 500 milliGRAM(s) Oral two times a day  cholecalciferol 2000 Unit(s) Oral daily  cyanocobalamin 1000 MICROGram(s) Oral daily  enoxaparin Injectable 40 milliGRAM(s) SubCutaneous daily  guaiFENesin   Syrup  (Sugar-Free) 200 milliGRAM(s) Oral every 6 hours  levoFLOXacin IVPB 500 milliGRAM(s) IV Intermittent every 24 hours  potassium chloride    Tablet ER 40 milliEquivalent(s) Oral once    MEDICATIONS  (PRN):  acetaminophen   Tablet .. 650 milliGRAM(s) Oral every 6 hours PRN Temp greater or equal to 38C (100.4F), Mild Pain (1 - 3)  ALBUTerol    90 MICROgram(s) HFA Inhaler 2 Puff(s) Inhalation every 6 hours PRN Shortness of Breath and/or Wheezing      __________________________________________________  REVIEW OF SYSTEMS:    CONSTITUTIONAL: No fever,   EYES: No acute visual disturbances  NECK: No pain or stiffness  RESPIRATORY: No cough; No shortness of breath  CARDIOVASCULAR: No chest pain, no palpitations  GASTROINTESTINAL: No pain. No nausea or vomiting.  No diarrhea   NEUROLOGICAL: No headache or numbness, no tremors  MUSCULOSKELETAL: No joint pain, no muscle pain  GENITOURINARY: No dysuria, no frequency, no hesitancy  PSYCHIATRY: No depression , no anxiety  ALL OTHER  ROS negative        Vital Signs Last 24 Hrs  T(C): 36.7 (24 Mar 2020 09:25), Max: 38.5 (24 Mar 2020 00:19)  T(F): 98 (24 Mar 2020 09:25), Max: 101.3 (24 Mar 2020 00:19)  HR: 66 (24 Mar 2020 09:25) (1 - 76)  BP: 122/65 (24 Mar 2020 09:25) (122/65 - 138/59)  BP(mean): --  RR: 18 (24 Mar 2020 09:25) (18 - 18)  SpO2: 98% (24 Mar 2020 09:25) (97% - 99%)    ________________________________________________  PHYSICAL EXAM:  GENERAL: NAD  HEENT: Normocephalic;  conjunctivae and sclerae clear; moist mucous membranes;   NECK : supple  CHEST/LUNG: Clear to auscultation bilaterally with good air entry   HEART: S1 S2  regular; no murmurs, gallops or rubs  ABDOMEN: Soft, Nontender, Nondistended; Bowel sounds present x 4 quad  EXTREMITIES: No cyanosis; no edema; no calf tenderness  SKIN: Warm and dry; no rash  NERVOUS SYSTEM:  Awake and alert; Oriented  to place, person and time ; no new deficits    _________________________________________________  LABS:                        13.4   5.91  )-----------( 109      ( 24 Mar 2020 10:40 )             39.2     03-24    133<L>  |  101  |  10  ----------------------------<  177<H>  3.3<L>   |  22  |  0.72    Ca    8.3<L>      24 Mar 2020 10:40  Phos  2.0     03-23  Mg     2.1     03-    TPro  6.6  /  Alb  2.6<L>  /  TBili  0.7  /  DBili  x   /  AST  56<H>  /  ALT  26  /  AlkPhos  68  03-24    PT/INR - ( 23 Mar 2020 06:48 )   PT: 15.3 sec;   INR: 1.34 ratio         PTT - ( 22 Mar 2020 15:06 )  PTT:35.6 sec  Urinalysis Basic - ( 23 Mar 2020 02:54 )    Color: Yellow / Appearance: Clear / S.015 / pH: x  Gluc: x / Ketone: Trace  / Bili: Negative / Urobili: Negative   Blood: x / Protein: 30 mg/dL / Nitrite: Negative   Leuk Esterase: Negative / RBC: 2-5 /HPF / WBC 0-2 /HPF   Sq Epi: x / Non Sq Epi: Few /HPF / Bacteria: Few /HPF      CAPILLARY BLOOD GLUCOSE            RADIOLOGY & ADDITIONAL TESTS:    Imaging Personally Reviewed:  YES/NO    Consultant(s) Notes Reviewed:   YES/ No    Care Discussed with Consultants :     Plan of care was discussed with patient and /or primary care giver; all questions and concerns were addressed and care was aligned with patient's wishes. NP Note discussed with  primary attending    Patient is a 79y old  Male who presents with a chief complaint of fever and cough (24 Mar 2020 09:37)    80yo male from home AXOX2-3 with PMH of HTN and HLD.  Presented to the ED w/ fever, cough, muscles aches and dizziness.  Flu A, B & RSV not detected. CXR showed b/l PNA for which pt received Cefepime and Levaquin in ED. COVID 19 result pending. Pt currently on Levaquin 500mg IV QD, D3.  Febrile overnight.  Transaminitis continues and Atorvastatin discontinued.  Hyponatremia continues and fluids restricted.        INTERVAL HPI/OVERNIGHT EVENTS: no new complaints    MEDICATIONS  (STANDING):  ascorbic acid 500 milliGRAM(s) Oral two times a day  cholecalciferol 2000 Unit(s) Oral daily  cyanocobalamin 1000 MICROGram(s) Oral daily  enoxaparin Injectable 40 milliGRAM(s) SubCutaneous daily  guaiFENesin   Syrup  (Sugar-Free) 200 milliGRAM(s) Oral every 6 hours  levoFLOXacin IVPB 500 milliGRAM(s) IV Intermittent every 24 hours  potassium chloride    Tablet ER 40 milliEquivalent(s) Oral once    MEDICATIONS  (PRN):  acetaminophen   Tablet .. 650 milliGRAM(s) Oral every 6 hours PRN Temp greater or equal to 38C (100.4F), Mild Pain (1 - 3)  ALBUTerol    90 MICROgram(s) HFA Inhaler 2 Puff(s) Inhalation every 6 hours PRN Shortness of Breath and/or Wheezing      __________________________________________________  REVIEW OF SYSTEMS:    CONSTITUTIONAL: No fever today.  Confused.    EYES: No acute visual disturbances  NECK: No pain or stiffness  RESPIRATORY: No cough; No shortness of breath  CARDIOVASCULAR: No chest pain, no palpitations  GASTROINTESTINAL: No pain. No nausea or vomiting.  No diarrhea   NEUROLOGICAL: No headache or numbness, no tremors  MUSCULOSKELETAL: No joint pain, no muscle pain  GENITOURINARY: No dysuria, no frequency, no hesitancy  PSYCHIATRY: No depression , no anxiety  ALL OTHER  ROS negative        Vital Signs Last 24 Hrs  T(C): 36.7 (24 Mar 2020 09:25), Max: 38.5 (24 Mar 2020 00:19)  T(F): 98 (24 Mar 2020 09:25), Max: 101.3 (24 Mar 2020 00:19)  HR: 66 (24 Mar 2020 09:25) (1 - 76)  BP: 122/65 (24 Mar 2020 09:25) (122/65 - 138/59)  RR: 18 (24 Mar 2020 09:25) (18 - 18)  SpO2: 98% (24 Mar 2020 09:25) (97% - 99%)    ________________________________________________  PHYSICAL EXAM:  GENERAL: NAD  HEENT: Normocephalic;  conjunctivae and sclerae clear; moist mucous membranes;   NECK : supple  CHEST/LUNG: Clear to auscultation bilaterally with good air entry   HEART: S1 S2  regular; no murmurs, gallops or rubs  ABDOMEN: Soft, Nontender, Nondistended; Bowel sounds present x 4 quad  EXTREMITIES: No cyanosis; no edema; no calf tenderness  SKIN: Warm and dry; no rash  NERVOUS SYSTEM:  Awake and alert; Oriented  to place, person and time ; no new deficits    _________________________________________________  LABS:                        13.4   5.91  )-----------( 109      ( 24 Mar 2020 10:40 )             39.2     03-24    133<L>  |  101  |  10  ----------------------------<  177<H>  3.3<L>   |  22  |  0.72    Ca    8.3<L>      24 Mar 2020 10:40  Phos  2.0     03-23  Mg     2.1     03-23    TPro  6.6  /  Alb  2.6<L>  /  TBili  0.7  /  DBili  x   /  AST  56<H>  /  ALT  26  /  AlkPhos  68  03-24    PT/INR - ( 23 Mar 2020 06:48 )   PT: 15.3 sec;   INR: 1.34 ratio         PTT - ( 22 Mar 2020 15:06 )  PTT:35.6 sec  Urinalysis Basic - ( 23 Mar 2020 02:54 )    Color: Yellow / Appearance: Clear / S.015 / pH: x  Gluc: x / Ketone: Trace  / Bili: Negative / Urobili: Negative   Blood: x / Protein: 30 mg/dL / Nitrite: Negative   Leuk Esterase: Negative / RBC: 2-5 /HPF / WBC 0-2 /HPF   Sq Epi: x / Non Sq Epi: Few /HPF / Bacteria: Few /HPF        RADIOLOGY & ADDITIONAL TESTS:  No new imaging NP Note discussed with  primary attending    Patient is a 79y old  Male who presents with a chief complaint of fever and cough (24 Mar 2020 09:37)    80yo male from home AXOX2-3 with PMH of HTN and HLD.  Presented to the ED w/ fever, cough, muscles aches and dizziness.  Flu A, B & RSV not detected. CXR showed b/l PNA for which pt received Cefepime and Levaquin in ED. COVID 19 result pending. Pt currently on Levaquin 500mg IV QD, D3.  Febrile overnight.  Transaminitis continues and Atorvastatin discontinued.  Hyponatremia continues and fluids restricted.        INTERVAL HPI/OVERNIGHT EVENTS: no new complaints    MEDICATIONS  (STANDING):  ascorbic acid 500 milliGRAM(s) Oral two times a day  cholecalciferol 2000 Unit(s) Oral daily  cyanocobalamin 1000 MICROGram(s) Oral daily  enoxaparin Injectable 40 milliGRAM(s) SubCutaneous daily  guaiFENesin   Syrup  (Sugar-Free) 200 milliGRAM(s) Oral every 6 hours  levoFLOXacin IVPB 500 milliGRAM(s) IV Intermittent every 24 hours  potassium chloride    Tablet ER 40 milliEquivalent(s) Oral once    MEDICATIONS  (PRN):  acetaminophen   Tablet .. 650 milliGRAM(s) Oral every 6 hours PRN Temp greater or equal to 38C (100.4F), Mild Pain (1 - 3)  ALBUTerol    90 MICROgram(s) HFA Inhaler 2 Puff(s) Inhalation every 6 hours PRN Shortness of Breath and/or Wheezing      __________________________________________________  REVIEW OF SYSTEMS:    CONSTITUTIONAL: No fever today.  Confused.    EYES: No acute visual disturbances  NECK: No pain or stiffness  RESPIRATORY: No cough; No shortness of breath  CARDIOVASCULAR: No chest pain, no palpitations  GASTROINTESTINAL: No pain. No nausea or vomiting.  No diarrhea   NEUROLOGICAL: No headache or numbness, no tremors  MUSCULOSKELETAL: No joint pain, no muscle pain  GENITOURINARY: No dysuria, no frequency, no hesitancy  PSYCHIATRY: No depression , no anxiety  ALL OTHER  ROS negative        Vital Signs Last 24 Hrs  T(C): 36.7 (24 Mar 2020 09:25), Max: 38.5 (24 Mar 2020 00:19)  T(F): 98 (24 Mar 2020 09:25), Max: 101.3 (24 Mar 2020 00:19)  HR: 66 (24 Mar 2020 09:25) (1 - 76)  BP: 122/65 (24 Mar 2020 09:25) (122/65 - 138/59)  RR: 18 (24 Mar 2020 09:25) (18 - 18)  SpO2: 98% (24 Mar 2020 09:25) (97% - 99%)    ________________________________________________  PHYSICAL EXAM:  GENERAL: NAD  HEENT: Normocephalic;  conjunctivae and sclerae clear; moist mucous membranes;   NECK : supple  CHEST/LUNG: Clear to auscultation bilaterally with good air entry   HEART: S1 S2  regular; no murmurs, gallops or rubs  ABDOMEN: Soft, Nontender, Nondistended; Bowel sounds present x 4 quad  EXTREMITIES: No cyanosis; no edema; no calf tenderness  SKIN: Warm and dry; no rash  NERVOUS SYSTEM:  Awake and alert; Oriented  to place, person, not time ; no new deficits    _________________________________________________  LABS:                        13.4   5.91  )-----------( 109      ( 24 Mar 2020 10:40 )             39.2     03-24    133<L>  |  101  |  10  ----------------------------<  177<H>  3.3<L>   |  22  |  0.72    Ca    8.3<L>      24 Mar 2020 10:40  Phos  2.0     03-23  Mg     2.1     03-23    TPro  6.6  /  Alb  2.6<L>  /  TBili  0.7  /  DBili  x   /  AST  56<H>  /  ALT  26  /  AlkPhos  68  03-24    PT/INR - ( 23 Mar 2020 06:48 )   PT: 15.3 sec;   INR: 1.34 ratio         PTT - ( 22 Mar 2020 15:06 )  PTT:35.6 sec  Urinalysis Basic - ( 23 Mar 2020 02:54 )    Color: Yellow / Appearance: Clear / S.015 / pH: x  Gluc: x / Ketone: Trace  / Bili: Negative / Urobili: Negative   Blood: x / Protein: 30 mg/dL / Nitrite: Negative   Leuk Esterase: Negative / RBC: 2-5 /HPF / WBC 0-2 /HPF   Sq Epi: x / Non Sq Epi: Few /HPF / Bacteria: Few /HPF        RADIOLOGY & ADDITIONAL TESTS:  No new imaging

## 2020-03-24 NOTE — PROGRESS NOTE ADULT - PROBLEM SELECTOR PROBLEM 8
Hpi Title: Evaluation of Skin Lesions How Severe Are Your Spot(S)?: moderate Have Your Spot(S) Been Treated In The Past?: has not been treated Family Member: Father, grandfather Discharge planning issues

## 2020-03-24 NOTE — PROGRESS NOTE ADULT - PROBLEM SELECTOR PLAN 1
Most likely multifactorial-Infection + Hyponatremia   Febrile overnight  F/u COVID-19  Continue supportive measure-fluid restricted for hyponatremia  Maintain fall precautions Most likely multifactorial-Infection + Hyponatremia   Febrile overnight  F/u COVID-19  Continue supportive measures-fluid restricted for hyponatremia  Maintain fall precautions

## 2020-03-24 NOTE — PROGRESS NOTE ADULT - PROBLEM SELECTOR PLAN 4
acceptable   Diet controlled  Monitor BP
AST continues uptrending therefore Atorvastatin dc'd.    F/u LFTs in AM

## 2020-03-24 NOTE — PROGRESS NOTE ADULT - PROBLEM SELECTOR PLAN 5
Lipid panel WNL  c/w Statin for now. Discontinue if LFT's continue to uptrend  Monitor LFT's
Stable w/ diet control  Continue to monitor

## 2020-03-24 NOTE — PROGRESS NOTE ADULT - PROBLEM SELECTOR PLAN 8
Pt's weak and family might not be able to care for pt at home.  Therefore, PT needed to possibly offer Rehab Pt's weak and confused.   Family might not be able to care for pt at home.  Therefore, PT needed to possibly offer Rehab

## 2020-03-24 NOTE — PROGRESS NOTE ADULT - PROBLEM SELECTOR PLAN 2
Repeat CXR in AM  Saturating on RA  Continue Levaquin, D3  Continue Antitussive & Albuterol PRN  F/u COVID-19 Repeat CXR in AM  Saturating on RA, 97-99%  Continue Levaquin, D3  Continue Antitussive & Albuterol PRN  F/u COVID-19 Repeat CXR in AM  Saturating on RA, 97-99%  Continue Levaquin, D3 of 7  Continue Antitussive & Albuterol PRN  F/u COVID-19

## 2020-03-25 VITALS
HEART RATE: 86 BPM | SYSTOLIC BLOOD PRESSURE: 120 MMHG | DIASTOLIC BLOOD PRESSURE: 64 MMHG | RESPIRATION RATE: 17 BRPM | OXYGEN SATURATION: 94 % | TEMPERATURE: 98 F

## 2020-03-25 LAB
ALBUMIN SERPL ELPH-MCNC: 2.7 G/DL — LOW (ref 3.5–5)
ALP SERPL-CCNC: 66 U/L — SIGNIFICANT CHANGE UP (ref 40–120)
ALT FLD-CCNC: 35 U/L DA — SIGNIFICANT CHANGE UP (ref 10–60)
ANION GAP SERPL CALC-SCNC: 9 MMOL/L — SIGNIFICANT CHANGE UP (ref 5–17)
AST SERPL-CCNC: 62 U/L — HIGH (ref 10–40)
BASOPHILS # BLD AUTO: 0.01 K/UL — SIGNIFICANT CHANGE UP (ref 0–0.2)
BASOPHILS NFR BLD AUTO: 0.2 % — SIGNIFICANT CHANGE UP (ref 0–2)
BILIRUB SERPL-MCNC: 0.7 MG/DL — SIGNIFICANT CHANGE UP (ref 0.2–1.2)
BUN SERPL-MCNC: 14 MG/DL — SIGNIFICANT CHANGE UP (ref 7–18)
CALCIUM SERPL-MCNC: 8.5 MG/DL — SIGNIFICANT CHANGE UP (ref 8.4–10.5)
CHLORIDE SERPL-SCNC: 102 MMOL/L — SIGNIFICANT CHANGE UP (ref 96–108)
CO2 SERPL-SCNC: 24 MMOL/L — SIGNIFICANT CHANGE UP (ref 22–31)
CREAT SERPL-MCNC: 0.84 MG/DL — SIGNIFICANT CHANGE UP (ref 0.5–1.3)
EOSINOPHIL # BLD AUTO: 0 K/UL — SIGNIFICANT CHANGE UP (ref 0–0.5)
EOSINOPHIL NFR BLD AUTO: 0 % — SIGNIFICANT CHANGE UP (ref 0–6)
GLUCOSE SERPL-MCNC: 117 MG/DL — HIGH (ref 70–99)
HCT VFR BLD CALC: 40 % — SIGNIFICANT CHANGE UP (ref 39–50)
HGB BLD-MCNC: 13.6 G/DL — SIGNIFICANT CHANGE UP (ref 13–17)
IMM GRANULOCYTES NFR BLD AUTO: 0.5 % — SIGNIFICANT CHANGE UP (ref 0–1.5)
LYMPHOCYTES # BLD AUTO: 0.48 K/UL — LOW (ref 1–3.3)
LYMPHOCYTES # BLD AUTO: 8.7 % — LOW (ref 13–44)
MAGNESIUM SERPL-MCNC: 2.2 MG/DL — SIGNIFICANT CHANGE UP (ref 1.6–2.6)
MCHC RBC-ENTMCNC: 31.1 PG — SIGNIFICANT CHANGE UP (ref 27–34)
MCHC RBC-ENTMCNC: 34 GM/DL — SIGNIFICANT CHANGE UP (ref 32–36)
MCV RBC AUTO: 91.5 FL — SIGNIFICANT CHANGE UP (ref 80–100)
MONOCYTES # BLD AUTO: 0.42 K/UL — SIGNIFICANT CHANGE UP (ref 0–0.9)
MONOCYTES NFR BLD AUTO: 7.6 % — SIGNIFICANT CHANGE UP (ref 2–14)
NEUTROPHILS # BLD AUTO: 4.58 K/UL — SIGNIFICANT CHANGE UP (ref 1.8–7.4)
NEUTROPHILS NFR BLD AUTO: 83 % — HIGH (ref 43–77)
NRBC # BLD: 0 /100 WBCS — SIGNIFICANT CHANGE UP (ref 0–0)
PHOSPHATE SERPL-MCNC: 2 MG/DL — LOW (ref 2.5–4.5)
PLATELET # BLD AUTO: 109 K/UL — LOW (ref 150–400)
POTASSIUM SERPL-MCNC: 3.7 MMOL/L — SIGNIFICANT CHANGE UP (ref 3.5–5.3)
POTASSIUM SERPL-SCNC: 3.7 MMOL/L — SIGNIFICANT CHANGE UP (ref 3.5–5.3)
PROT SERPL-MCNC: 7 G/DL — SIGNIFICANT CHANGE UP (ref 6–8.3)
RBC # BLD: 4.37 M/UL — SIGNIFICANT CHANGE UP (ref 4.2–5.8)
RBC # FLD: 13.3 % — SIGNIFICANT CHANGE UP (ref 10.3–14.5)
SODIUM SERPL-SCNC: 135 MMOL/L — SIGNIFICANT CHANGE UP (ref 135–145)
WBC # BLD: 5.52 K/UL — SIGNIFICANT CHANGE UP (ref 3.8–10.5)
WBC # FLD AUTO: 5.52 K/UL — SIGNIFICANT CHANGE UP (ref 3.8–10.5)

## 2020-03-25 PROCEDURE — 82306 VITAMIN D 25 HYDROXY: CPT

## 2020-03-25 PROCEDURE — 36415 COLL VENOUS BLD VENIPUNCTURE: CPT

## 2020-03-25 PROCEDURE — 82728 ASSAY OF FERRITIN: CPT

## 2020-03-25 PROCEDURE — 81001 URINALYSIS AUTO W/SCOPE: CPT

## 2020-03-25 PROCEDURE — 84100 ASSAY OF PHOSPHORUS: CPT

## 2020-03-25 PROCEDURE — 85652 RBC SED RATE AUTOMATED: CPT

## 2020-03-25 PROCEDURE — 87086 URINE CULTURE/COLONY COUNT: CPT

## 2020-03-25 PROCEDURE — 85027 COMPLETE CBC AUTOMATED: CPT

## 2020-03-25 PROCEDURE — 96374 THER/PROPH/DIAG INJ IV PUSH: CPT

## 2020-03-25 PROCEDURE — 87040 BLOOD CULTURE FOR BACTERIA: CPT

## 2020-03-25 PROCEDURE — 84443 ASSAY THYROID STIM HORMONE: CPT

## 2020-03-25 PROCEDURE — 93005 ELECTROCARDIOGRAM TRACING: CPT

## 2020-03-25 PROCEDURE — 83735 ASSAY OF MAGNESIUM: CPT

## 2020-03-25 PROCEDURE — 84145 PROCALCITONIN (PCT): CPT

## 2020-03-25 PROCEDURE — 86140 C-REACTIVE PROTEIN: CPT

## 2020-03-25 PROCEDURE — 83605 ASSAY OF LACTIC ACID: CPT

## 2020-03-25 PROCEDURE — 99291 CRITICAL CARE FIRST HOUR: CPT | Mod: 25

## 2020-03-25 PROCEDURE — 80061 LIPID PANEL: CPT

## 2020-03-25 PROCEDURE — 99238 HOSP IP/OBS DSCHRG MGMT 30/<: CPT

## 2020-03-25 PROCEDURE — 83036 HEMOGLOBIN GLYCOSYLATED A1C: CPT

## 2020-03-25 PROCEDURE — 83615 LACTATE (LD) (LDH) ENZYME: CPT

## 2020-03-25 PROCEDURE — 71045 X-RAY EXAM CHEST 1 VIEW: CPT

## 2020-03-25 PROCEDURE — 85730 THROMBOPLASTIN TIME PARTIAL: CPT

## 2020-03-25 PROCEDURE — 80053 COMPREHEN METABOLIC PANEL: CPT

## 2020-03-25 PROCEDURE — 82607 VITAMIN B-12: CPT

## 2020-03-25 PROCEDURE — 71045 X-RAY EXAM CHEST 1 VIEW: CPT | Mod: 26

## 2020-03-25 PROCEDURE — 85610 PROTHROMBIN TIME: CPT

## 2020-03-25 PROCEDURE — U0001: CPT

## 2020-03-25 PROCEDURE — 96375 TX/PRO/DX INJ NEW DRUG ADDON: CPT

## 2020-03-25 PROCEDURE — 87631 RESP VIRUS 3-5 TARGETS: CPT

## 2020-03-25 RX ORDER — ALBUTEROL 90 UG/1
2 AEROSOL, METERED ORAL
Qty: 1 | Refills: 0
Start: 2020-03-25 | End: 2020-04-23

## 2020-03-25 RX ORDER — PREGABALIN 225 MG/1
1 CAPSULE ORAL
Qty: 30 | Refills: 0
Start: 2020-03-25 | End: 2020-04-23

## 2020-03-25 RX ORDER — TAMSULOSIN HYDROCHLORIDE 0.4 MG/1
1 CAPSULE ORAL
Qty: 0 | Refills: 0 | DISCHARGE

## 2020-03-25 RX ORDER — LOSARTAN POTASSIUM 100 MG/1
1 TABLET, FILM COATED ORAL
Qty: 0 | Refills: 0 | DISCHARGE

## 2020-03-25 RX ORDER — CHOLECALCIFEROL (VITAMIN D3) 125 MCG
1 CAPSULE ORAL
Qty: 30 | Refills: 0
Start: 2020-03-25 | End: 2020-04-23

## 2020-03-25 RX ORDER — ACETAMINOPHEN 500 MG
2 TABLET ORAL
Qty: 0 | Refills: 0 | DISCHARGE
Start: 2020-03-25

## 2020-03-25 RX ORDER — ACETAMINOPHEN 500 MG
250 TABLET ORAL
Qty: 0 | Refills: 0 | DISCHARGE

## 2020-03-25 RX ORDER — ASCORBIC ACID 60 MG
1 TABLET,CHEWABLE ORAL
Qty: 0 | Refills: 0 | DISCHARGE
Start: 2020-03-25

## 2020-03-25 RX ORDER — CIPROFLOXACIN LACTATE 400MG/40ML
1 VIAL (ML) INTRAVENOUS
Qty: 3 | Refills: 0
Start: 2020-03-25 | End: 2020-03-27

## 2020-03-25 RX ADMIN — Medication 500 MILLIGRAM(S): at 05:18

## 2020-03-25 RX ADMIN — Medication 2000 UNIT(S): at 12:15

## 2020-03-25 RX ADMIN — Medication 200 MILLIGRAM(S): at 12:15

## 2020-03-25 RX ADMIN — PREGABALIN 1000 MICROGRAM(S): 225 CAPSULE ORAL at 12:15

## 2020-03-25 RX ADMIN — Medication 200 MILLIGRAM(S): at 00:21

## 2020-03-25 RX ADMIN — Medication 200 MILLIGRAM(S): at 05:18

## 2020-03-25 NOTE — DISCHARGE NOTE NURSING/CASE MANAGEMENT/SOCIAL WORK - PATIENT PORTAL LINK FT
You can access the FollowMyHealth Patient Portal offered by [facility] by registering at the following website: [email or web address]. By joining [facility]’s FollowMyHealth portal, you will also be able to view your health information using other applications (apps) compatible with our system.

## 2020-03-25 NOTE — DISCHARGE NOTE NURSING/CASE MANAGEMENT/SOCIAL WORK - NSDPLANG ASIS_GEN_ALL_CORE
After Visit Summary   4/26/2017    Romayne L Sathre    MRN: [id number]           Patient Information     Date Of Birth          1947        Visit Information        Provider Department      4/26/2017 8:40 AM Sapphire Merritt APRN [facility]        Today's Diagnoses     Change in stool    -  1    Skin lesion        Hypertension goal BP (blood pressure) < 140/90        CKD (chronic kidney disease) stage 3, GFR 30-59 ml/min        Hyperlipidemia LDL goal <100        Acquired hypothyroidism          Care Instructions    1.  For change in stool habits I recommend we do a colonoscopy to rule out a serious cause like colon cancer.  If normal colonoscopy we should follow up to evaluate the diarrhea.    2.  Update labs today.  Restart cholesterol medication (atorvastatin/lipitor), I dont think it is causing diarrhea.  Refilled your blood pressure and tyroid medication.    3.  See dermatologist, I am concerned the lesion on thumb could be skin cancer    4.  Update pneumonia shot today            Follow-ups after your visit        Additional Services     DERMATOLOGY REFERRAL       Your provider has referred you to: [facility] [phone]   [email or web address]    Please be aware that coverage of these services is subject to the terms and limitations of your health insurance plan.  Call member services at your health plan with any benefit or coverage questions.      Please bring the following with you to your appointment:    (1) Any X-Rays, CTs or MRIs which have been performed.  Contact the facility where they were done to arrange for  prior to your scheduled appointment.    (2) List of current medications  (3) This referral request   (4) Any documents/labs given to you for this referral            GASTROENTEROLOGY ADULT REF PROCEDURE ONLY       Last Lab Result: Creatinine (mg/dL)       Date                     Value      "            03/31/2017               1.11 (H)         ----------  Body mass index is 36.18 kg/(m^2).     Needed:  No  Language:  English    Patient will be contacted to schedule procedure.     Please be aware that coverage of these services is subject to the terms and limitations of your health insurance plan.  Call member services at your health plan with any benefit or coverage questions.  Any procedures must be performed at a [facility] facility OR coordinated by your clinic's referral office.    Please bring the following with you to your appointment:    (1) Any X-Rays, CTs or MRIs which have been performed.  Contact the facility where they were done to arrange for  prior to your scheduled appointment.    (2) List of current medications   (3) This referral request   (4) Any documents/labs given to you for this referral                  Who to contact     If you have questions or need follow up information about today's clinic visit or your schedule please contact [facility] directly at [phone].  Normal or non-critical lab and imaging results will be communicated to you by [employer], letter or phone within 4 business days after the clinic has received the results. If you do not hear from us within 7 days, please contact the clinic through [employer] or phone. If you have a critical or abnormal lab result, we will notify you by phone as soon as possible.  Submit refill requests through [employer] or call your pharmacy and they will forward the refill request to us. Please allow 3 business days for your refill to be completed.          Additional Information About Your Visit        [employer] Information     [employer] lets you send messages to your doctor, view your test results, renew your prescriptions, schedule appointments and more. To sign up, go to [email or web address] . Click on \"Log in\" on the left side of the screen, which will take you to the Welcome page. Then click on \"Sign up " "Now\" on the right side of the page.     You will be asked to enter the access code listed below, as well as some personal information. Please follow the directions to create your username and password.     Your access code is: [email or web address]  Expires: 2017  8:56 AM     Your access code will  in 90 days. If you need help or a new code, please call your [facility] clinic or [phone].        Care EveryWhere ID     This is your Care EveryWhere ID. This could be used by other organizations to access your [facility] medical records  [id number]        Your Vitals Were     Pulse Temperature Respirations Pulse Oximetry BMI (Body Mass Index)       84 98.4  F (36.9  C) (Tympanic) 16 97% 36.18 kg/m2        Blood Pressure from Last 3 Encounters:   17 122/74   16 134/74   16 114/76    Weight from Last 3 Encounters:   17 245 lb (111.1 kg)   16 238 lb (108 kg)   16 246 lb (111.6 kg)              We Performed the Following     Basic metabolic panel     CBC with platelets     DERMATOLOGY REFERRAL     GASTROENTEROLOGY ADULT REF PROCEDURE ONLY     Lipid panel reflex to direct LDL     TSH with free T4 reflex          Where to get your medicines      These medications were sent to [facility] [address]  [address] [address]     Phone:  [phone]     levothyroxine 200 MCG tablet    lisinopril 5 MG tablet          Primary Care Provider Office Phone # Fax #    [facility] [phone]       [facility] [address]  [facility] [address]        Thank you!     Thank you for choosing [facility]  for your care. Our goal is always to provide you with excellent care. Hearing back from our patients is one way we can continue to improve our services. Please take a few minutes to complete the written survey that you may receive in the mail after your visit with us. Thank you!      "        Your Updated Medication List - Protect others around you: Learn how to safely use, store and throw away your medicines at www.disposemymeds.org.          This list is accurate as of: 4/26/17  9:04 AM.  Always use your most recent med list.                   Brand Name Dispense Instructions for use    atorvastatin 10 MG tablet    LIPITOR    90 tablet    Take 1 tablet (10 mg) by mouth daily       levothyroxine 200 MCG tablet    SYNTHROID/LEVOTHROID    90 tablet    Take 1 tablet (200 mcg) by mouth daily       lisinopril 5 MG tablet    PRINIVIL/ZESTRIL    90 tablet    Take 1 tablet (5 mg) by mouth daily       Multi-vitamin Tabs tablet   Generic drug:  multivitamin, therapeutic with minerals      Take 1 tablet by mouth daily.       order for DME     1 Units    Equipment being ordered: bilateral knee high compression, 20-30mmHg compression          No

## 2020-03-25 NOTE — PROGRESS NOTE ADULT - ASSESSMENT
M E D I C A L   A T T E N D I N G    F O L L O W    U P   N O T E                                     ------------------------------------------------------------------------------------------------    patient evaluated by me, case discussed with team, chart, medications, and physical exam reviewed, labs / tests  and vitals reviewed by me, as laxmi.   Patient is stable for discharge today.  Patient to follow up with  PMD  See discharge document for full note.      ==================>> MEDICATIONS <<====================    ascorbic acid 500 milliGRAM(s) Oral two times a day  cholecalciferol 2000 Unit(s) Oral daily  cyanocobalamin 1000 MICROGram(s) Oral daily  guaiFENesin   Syrup  (Sugar-Free) 200 milliGRAM(s) Oral every 6 hours  levoFLOXacin IVPB 500 milliGRAM(s) IV Intermittent every 24 hours    MEDICATIONS  (PRN):  acetaminophen   Tablet .. 650 milliGRAM(s) Oral every 6 hours PRN Temp greater or equal to 38C (100.4F), Mild Pain (1 - 3)  ALBUTerol    90 MICROgram(s) HFA Inhaler 2 Puff(s) Inhalation every 6 hours PRN Shortness of Breath and/or Wheezing    ==================>> VITAL SIGNS <<==================    T(C): 36.5 (03-25-20 @ 08:52), Max: 37.7 (03-25-20 @ 00:22)  HR: 86 (03-25-20 @ 08:52) (69 - 104)  BP: 120/64 (03-25-20 @ 08:52) (120/64 - 139/71)  BP(mean): --  RR: 17 (03-25-20 @ 08:52) (17 - 18)  SpO2: 94% (03-25-20 @ 08:52) (93% - 96%)        ==================>> LAB AND IMAGING <<==================                        13.6   5.52  )-----------( 109      ( 25 Mar 2020 09:12 )             40.0        03-25    135  |  102  |  14  ----------------------------<  117<H>  3.7   |  24  |  0.84    Ca    8.5      25 Mar 2020 09:12  Phos  2.0     03-25  Mg     2.2     03-25    TPro  7.0  /  Alb  2.7<L>  /  TBili  0.7  /  DBili  x   /  AST  62<H>  /  ALT  35  /  AlkPhos  66  03-25                     TSH:      1.69   (03-23-20)           Lipid profile:  (03-23-20)     Total: 174     LDL  : 96     HDL  :63     TG   :74     HgA1C: 5.8  (03-23-20)          WBC count:   5.52 <<== ,  5.91 <<== ,  9.39 <<== ,  11.55 <<==   Hemoglobin:   13.6 <<==,  13.4 <<==,  12.7 <<==,  12.8 <<==  platelets:  109 <==, 109 <==, 113 <==, 124 <==    Creatinine:  0.84  <<==, 0.78  <<==, 0.72  <<==, 0.74  <<==, 0.97  <<==  Sodium:   135  <==, 134  <==, 133  <==, 134  <==, 135  <==       AST:          62 <== , 58 <== , 56 <== , 42 <== , 30 <==      ALT:        35  <== , 32  <== , 26  <== , 25  <== , 24  <==      AP:        66  <=, 73  <=, 68  <=, 70  <=, 75  <=     Bili:        0.7  <=, 0.7  <=, 0.7  <=, 1.3  <=, 1.3  <=
_________________________________________________________________________________________  ========>>  M E D I C A L   A T T E N D I N G    F O L L O W  U P  N O T E  <<=========  -----------------------------------------------------------------------------------------------------    - Patient seen and examined by me earlier today.   - In summary,  CRIS GALARZA is a 79y year old man who originally presented with fever, cough , AMS  - Patient today overall doing ok, comfortable, eating OK.     ==================>> REVIEW OF SYSTEM <<=================    GEN: no fever, no chills, no pain  RESP: no SOB, + cough, no sputum   CVS: no chest pain, no palpitations, no edema  GI: no abdominal pain, no nausea  : no dysuria, no frequency, no hematuria  Neuro: no headache, no dizziness  Derm : no itching, no rash    ==================>> PHYSICAL EXAM <<=================    GEN: A&O X 2 , NAD , comfortable, remains a bit disoriented   HEENT: NCAT, PERRL, MMM, hearing intact  Neck: supple , no JVD appreciated  CVS: S1S2 , regular , No M/R/G appreciated   PULM: mild rhonchi bilaterally   ABD.: soft. non tender, non distended,  bowel sounds present  Extrem: intact pulses , no edema   PSYCH : normal mood,  not anxious      ==================>> MEDICATIONS <<====================    MEDICATIONS  (STANDING):  ascorbic acid 500 milliGRAM(s) Oral two times a day  enoxaparin Injectable 40 milliGRAM(s) SubCutaneous daily  guaiFENesin   Syrup  (Sugar-Free) 200 milliGRAM(s) Oral every 6 hours  levoFLOXacin IVPB 500 milliGRAM(s) IV Intermittent every 24 hours  potassium chloride    Tablet ER 20 milliEquivalent(s) Oral once  simvastatin 20 milliGRAM(s) Oral at bedtime  sodium chloride 0.9%. 1000 milliLiter(s) (75 mL/Hr) IV Continuous <Continuous>    MEDICATIONS  (PRN):  acetaminophen   Tablet .. 650 milliGRAM(s) Oral every 6 hours PRN Temp greater or equal to 38C (100.4F), Mild Pain (1 - 3)  ALBUTerol    90 MICROgram(s) HFA Inhaler 2 Puff(s) Inhalation every 6 hours PRN Shortness of Breath and/or Wheezing      ==================>> VITAL SIGNS <<==================    T(C): 38.6 (20 @ 12:23), Max: 38.6 (20 @ 12:23)  HR: 115 (20 @ 12:23) (77 - 115)  BP: 159/89 (20 @ 12:23) (117/47 - 159/89)  RR: 18 (20 @ 12:23) (17 - 18)  SpO2: 93% (20 @ 12:23) (92% - 98%)      ==================>> LAB AND IMAGING <<==================                        12.7   9.39  )-----------( 113      ( 23 Mar 2020 06:48 )             38.8        134<L>  |  103  |  11  ----------------------------<  91  3.3<L>   |  24  |  0.74    Ca    8.1<L>      23 Mar 2020 06:48  Phos  2.0       Mg     2.1         TPro  6.8  /  Alb  2.9<L>  /  TBili  1.3<H>  /  DBili  x   /  AST  42<H>  /  ALT  25  /  AlkPhos  70      PT/INR - ( 23 Mar 2020 06:48 )   PT: 15.3 sec;   INR: 1.34 ratio    PTT - ( 22 Mar 2020 15:06 )  PTT:35.6 sec               Urinalysis Basic - ( 23 Mar 2020 02:54 )  Color: Yellow / Appearance: Clear / S.015 / pH: x  Gluc: x / Ketone: Trace  / Bili: Negative / Urobili: Negative   Blood: x / Protein: 30 mg/dL / Nitrite: Negative   Leuk Esterase: Negative / RBC: 2-5 /HPF / WBC 0-2 /HPF   Sq Epi: x / Non Sq Epi: Few /HPF / Bacteria: Few /HPF    TSH:      1.69   (20)           Lipid profile:  (20)     Total: 174     LDL  : 96     HDL  :63     TG   :74     Sedimentation Rate, Erythrocyte: 21 mm/Hr (20 @ 06:48)  C-Reactive Protein, Serum: 24.07 mg/dL (20 @ 12:03)  Procalcitonin, Serum: 2.80    Vitamin B12, Serum: 225 pg/mL (20 @ 12:37)  Vitamin D, 25-Hydroxy: 16.8    COVID-19 pending   ___________________________________________________________________________________  ===============>>  A S S E S S M E N T   A N D   P L A N <<===============  ------------------------------------------------------------------------------------------    · Assessment      Patient is an 79 year old male from home alert and oriented x2-3, with PMH of HTN and HLD, who comes in to the ED for fevers, cough and muscles aches.    Problem/Plan - 1:  ·  Problem: Multifocal pneumonia.  with fever and cough and metabolic encephalopathy   continue levaquin for now   f/u blood cx  COVID test ordered by ED, pending   monitor O2 sat  albuterol as needed.  Problem/Plan - 2:  ·  Problem: history of HLD  lipid profile noted    Problem/Plan - 3:  ·  Problem: history of HTN  not currently on any meds  monitor BP and add meds as needed.     -GI/DVT Prophylaxis.    supplement vitamin D and B12 deficiency     --------------------------------------------  Case discussed with pt, Staff / NP   Education given on findings and plan of care  ___________________________  HSalud Caro D.O.  Pager: [phone]
_________________________________________________________________________________________  ========>>  M E D I C A L   A T T E N D I N G    F O L L O W  U P  N O T E  <<=========  -----------------------------------------------------------------------------------------------------    - Patient seen and examined by me earlier today.   - In summary,  CRIS GALARZA is a 79y year old man who originally presented with fever, cough , AMS  - Patient today overall doing ok, comfortable, eating OK. breathing better, no diarrhea, no cough, no SOB     ==================>> REVIEW OF SYSTEM <<=================    GEN: no fever, no chills, no pain  RESP: no SOB, no cough, no sputum   CVS: no chest pain, no palpitations, no edema  GI: no abdominal pain, no nausea  : no dysuria, no frequency, no hematuria  Neuro: no headache, no dizziness  Derm : no itching, no rash    ==================>> PHYSICAL EXAM <<=================    GEN: A&O X 2-3 , NAD , comfortable, MS improved   HEENT: NCAT, PERRL, MMM, hearing intact  Neck: supple , no JVD appreciated  CVS: S1S2 , regular , No M/R/G appreciated   PULM: mild rhonchi bilaterally   ABD.: soft. non tender, non distended,  bowel sounds present  Extrem: intact pulses , no edema   PSYCH : normal mood,  not anxious       ==================>> MEDICATIONS <<====================    ascorbic acid 500 milliGRAM(s) Oral two times a day  cholecalciferol 2000 Unit(s) Oral daily  cyanocobalamin 1000 MICROGram(s) Oral daily  guaiFENesin   Syrup  (Sugar-Free) 200 milliGRAM(s) Oral every 6 hours  levoFLOXacin IVPB 500 milliGRAM(s) IV Intermittent every 24 hours    MEDICATIONS  (PRN):  acetaminophen   Tablet .. 650 milliGRAM(s) Oral every 6 hours PRN Temp greater or equal to 38C (100.4F), Mild Pain (1 - 3)  ALBUTerol    90 MICROgram(s) HFA Inhaler 2 Puff(s) Inhalation every 6 hours PRN Shortness of Breath and/or Wheezing    ==================>> VITAL SIGNS <<==================    Vital Signs Last 24 Hrs  T(C): 36.7 (03-24-20 @ 09:25)  T(F): 98 (03-24-20 @ 09:25), Max: 101.3 (03-24-20 @ 00:19)  HR: 66 (03-24-20 @ 09:25) (66 - 76)  BP: 122/65 (03-24-20 @ 09:25)  RR: 18 (03-24-20 @ 09:25) (18 - 18)  SpO2: 98% (03-24-20 @ 09:25) (97% - 99%)       ==================>> LAB AND IMAGING <<==================                        13.4   5.91  )-----------( 109      ( 24 Mar 2020 10:40 )             39.2        03-24    133<L>  |  101  |  10  ----------------------------<  177<H>  3.3<L>   |  22  |  0.72    Ca    8.3<L>      24 Mar 2020 10:40  Phos  2.0     03-23  Mg     2.1     03-23    TPro  6.6  /  Alb  2.6<L>  /  TBili  0.7  /  DBili  x   /  AST  56<H>  /  ALT  26  /  AlkPhos  68  03-24    WBC count:   5.91 <<== ,  9.39 <<== ,  11.55 <<==   Hemoglobin:   13.4 <<==,  12.7 <<==,  12.8 <<==  platelets:  109 <==, 113 <==, 124 <==    Creatinine:  0.72  <<==, 0.74  <<==, 0.97  <<==  Sodium:   133  <==, 134  <==, 135  <==       AST:          56 <== , 42 <== , 30 <==      ALT:        26  <== , 25  <== , 24  <==      AP:        68  <=, 70  <=, 75  <=     Bili:        0.7  <=, 1.3  <=, 1.3  <=    Vitamin B12, Serum: 225 pg/mL (03.23.20 @ 12:37)  Vitamin D, 25-Hydroxy: 16.8    COVID-19 pending   ___________________________________________________________________________________  ===============>>  A S S E S S M E N T   A N D   P L A N <<===============  ------------------------------------------------------------------------------------------    · Assessment		  Patient is an 79 year old male from home alert and oriented x2-3, with PMH of HTN and HLD, who comes in to the ED for fevers, cough and muscles aches.    Problem/Plan - 1:  ·  Problem: Multifocal pneumonia.  with fever and cough and metabolic encephalopathy   continue levaquin for now >> total 7 days   f/u blood cx  COVID test ordered by ED, pending   monitor O2 sat  albuterol as needed.  OOB / ambulate     Problem/Plan - 2:  ·  Problem: history of HLD  lipid profile noted  can resume statin as OP     Problem/Plan - 3:  ·  Problem: history of HTN  not currently on any meds  monitor BP and add meds as needed.     -GI/DVT Prophylaxis.    supplement vitamin D and B12 deficiency     DC planing home   --------------------------------------------  Case discussed with pt, NP   Education given on findings and plan of care  ___________________________  HSalud Caro D.O.  Pager: [phone]
80yo male w/ Multifactorial B/L PNA & Rule out COVID test pending, stable

## 2021-04-22 NOTE — ED PROVIDER NOTE - PHYSICAL EXAMINATION
No Jo Patient Age: 89 year old  MESSAGE: Interpreting service used: No      IM/FP- Orders- Order Request-      Type of order being requested: Hearing Test      Reason order is needed: follow up.      Is the patient currently having any symptoms? No- Route message to provider's clinical pool.         ALLERGIES:  Gabapentin, Hydrocodone-acetaminophen, Lisinopril, Sulfa antibiotics, and Trazodone  Current Outpatient Medications   Medication   • acetaminophen-codeine (TYLENOL NO.3) 300-30 MG per tablet   • budesonide (ENTOCORT EC) 3 MG 24 hr capsule   • cyclobenzaprine (FLEXERIL) 5 MG tablet   • ergocalciferol (DRISDOL) 1.25 mg (50,000 units) capsule   • metoPROLOL succinate (TOPROL-XL) 25 MG 24 hr tablet   • calcitRIOL (ROCALTROL) 0.25 MCG capsule   • neomycin-polymyxin B-dexamethasone (MAXITROL) 3.5-33003-4.1 ophthalmic suspension   • allopurinol (ZYLOPRIM) 100 MG tablet   • ondansetron (ZOFRAN) 4 MG tablet   • naLOXone (NARCAN) 4 MG/0.1ML nasal spray   • omeprazole (PRILOSEC) 40 MG capsule   • furosemide (LASIX) 20 MG tablet   • prochlorperazine (COMPAZINE) 10 MG tablet   • escitalopram (LEXAPRO) 20 MG tablet   • fluticasone (FLONASE) 50 MCG/ACT nasal spray     No current facility-administered medications for this visit.     PHARMACY to use: see below           Pharmacy preference(s) on file:   [id number] - [place] - [address]  [address] [place] [address]  Phone: [phone] Fax: [phone]      CALL BACK INFO: Ok to leave response (including medical information) on answering machine      PCP: Nicky Anaya DO         INS: Payor: MEDICARE / Plan: PARTA AND B / Product Type: MEDICARE   PATIENT ADDRESS:  [address] [place] [address] [place] [address]       RESPIRATORY: bilateral basal crackles  no respiratory distress

## 2021-05-05 NOTE — PATIENT PROFILE ADULT - STATED REASON FOR ADMISSION
fever/cough Curettage Text: The wound bed was treated with curettage after the biopsy was performed.

## 2022-12-10 NOTE — H&P ADULT - RS GEN PE MLT RESP DETAILS PC
Dear Matthew JEWELL,   Medication refills: If you were prescribed a medication today please be aware that the [facility] providers are not able to provide refills on these medications. If you require a refill, please contact your primary care provider. If you do not have a primary care provider please call [phone] and we can schedule a visit with an [facility] primary care provider.      Billing department:    Call with any billing questions or concerns.    Phone: [phone]. Hours: Mon. - Thu. 7:30 a.m. - 6 p.m. and Friday 7:30 p.m. - 5 p.m.        Thank you for connecting with us today on the [facility] service of [facility]. If you have any questions after your visit, please feel free to contact us at [phone]. If you are experiencing a medical emergency, call 911 immediately.  If your symptoms worsen or do not improve, please contact your primary care provider to schedule an in-person visit. Symptoms that require immediate attention require a visit at Urgent Care ([place]), [facility] ([place]) or the Emergency Room of a nearby hospital.       ___________________________________________________________________________________________________________________________    COVID Treatment:       Paxlovid is an oral treatment available for mild-moderate COVID-19 infections in patients at high risk of progression to severe COVID-19. This medication has been shown to reduce hospitalization and death by 88%.    Notify your healthcare immediately if,  you have renal disease or impairment, you will receive a lower dose.   you did not list all medications you were taking at the time of your visit.There are several medications that interact with Paxlovid.     Drug interactions:   There are several medication interactions with Paxlovid.   Do not take any additional medication other than what the provider instructed.       Instructions for use:     Paxlovid blister pack  contains two medications Nirmatrelvir 300 mg (two 150 mg tablets) with Ritonavir 100 mg, all three tablets taken together twice daily x 5 days.   Missed dose: If a dose is missed within 8 hours of usual administration time, the missed dose should be administered as soon as possible, and normal dosing schedule should resume. If a dose is missed by more than 8 hours, the missed dose should not be administered, and dosing should resume at the next scheduled administration time. Do not double the dose to make up for a missed dose.     Medication Interactions:    If you are using a combined hormonal birth control, please use an effective alternative contraceptive method or an additional barrier method of contraception while taking Paxlovid. Paxlovid may reduce the efficacy of combined hormonal contraceptives.    Side effects:   Diarrhea, high blood pressure, body aches, taste disturbance.    COVID Rebound:  You may experience COVID-19 rebound if you have been diagnosed in the past 2 weeks and have recovered from COVID-19. If you experience COVID-19 rebound, restart the recommended 5-day isolation period.  You may end isolation after 5 days if symptoms are improving, no fever for 24 hours or use of  of fever-reducing medication.     There is currently no evidence that additional treatment is needed with Paxlovid or other anti-SARS-CoV-2 therapies in cases where COVID-19 rebound is suspected regardless of vaccination status.    Symptoms Management:     Instructions for Adults:     Take Mucinex (guaifenesin) 600 mg twice daily, this is an expectorant which means it thins the mucus/phlegm so one can blow, cough or spit mucus/phlegm out better.    Take Flonase nasal spray 2 sprays in each nostril daily for 2 weeks for nasal congestion.     If needed you may take Sudafed as instructed on the package, if sinus pressure is present (do not take if you have history of high blood pressure/hypertension, are pregnant or  breastfeeding).      If needed you may take Tylenol every 4 hours as needed for fever or aches (Do not exceed 4,000mg in 24 hours).    Drink Warm tea with honey.    Salt water gargles and throat lozenges for sore throat.      Instructions for Everyone:     Drink lots of fluids.     Use a cool mist Humidifier.     Use a warm moist compress to sinuses 4-6 times daily to help facilitate sinus drainage.      Sterile saline rinses 3 times daily (nasal saline spray)    Cover coughs or sneezes.      Practice good hand hygiene.        If symptoms worsen an in-person visit is needed. If symptoms are no better after 10 days return to [facility] for a follow up visit.              __________________________________________________________________________________________________________________________________________  Quarantine Guidelines:     Contact your employee health or human resources department for further guidance on employer specific quarantine and return to work guidelines. If your school age child has symptoms of COVID or has had a COVID exposure, contact your child's school as they may have additional instructions and requirements to return to school.      COVID POSITIVE:   You can end isolation after 5 full days if you are fever-free for 24 hours without the use of fever-reducing medication and your other symptoms have improved (Loss of taste and smell may persist for weeks or months after recovery and need not delay the end of isolation).    __________________________________________________________________________________________________________________________________________      Fact Sheet for Patients And Caregivers      FACT SHEET FOR PATIENTS, PARENTS, AND CAREGIVERS  EMERGENCY USE AUTHORIZATION (EUA) OF PAXLOVID    FOR CORONAVIRUS DISEASE 2019 (COVID-19)  You are being given this Fact Sheet because your healthcare provider believes it is necessary to provide you with PAXLOVID for the  treatment of mild-to-moderate  coronavirus disease (COVID-19) caused by the SARS-CoV-2 virus. This Fact Sheet contains information to help you understand the risks and benefits of taking the  PAXLOVID you have received or may receive.    The U.S. Food and Drug Administration (FDA) has issued an Emergency Use Authorization (EUA) to make PAXLOVID available during the COVID-19 pandemic (for  more details about an EUA please see “What is an Emergency Use Authorization?” at the end of this document). PAXLOVID is not an FDA-approved medicine in the  [place]. Read this Fact Sheet for information about PAXLOVID. Talk to your healthcare provider about your options or if you have any questions. It is your choice to  take PAXLOVID.    What is COVID-19?  COVID-19 is caused by a virus called a coronavirus. You can get COVID-19 through close contact with another person who has the virus.COVID-19 illnesses have ranged from very mild-to-severe, including illness resulting in death. While information so far suggests that most COVID-19 illness is mild, serious illness can happen and may cause some of your other medical conditions to become worse. Older people and people of all ages with severe, long lasting (chronic) medical conditions like heart disease, lung disease, and diabetes, for example seem to be at higher risk of being hospitalized for COVID-19.    What is PAXLOVID?  PAXLOVID is an investigational medicine used to treat mild-to-moderate COVID-19 in adults and children [12 years of age and older weighing at least 88 pounds (40 kg)] with  positive results of direct SARS-CoV-2 viral testing, and who are at high risk for progression to severe COVID-19, including hospitalization or death. PAXLOVID is  investigational because it is still being studied. There is limited information about the safety and effectiveness of using PAXLOVID to treat people with mild-to-moderate  COVID-19.    The FDA has authorized the emergency use  of PAXLOVID for the treatment of mild-tomoderate COVID-19 in adults and children [12 years of age and older weighing at least  88 pounds (40 kg)] with a positive test for the virus that causes COVID-19, and who are at high risk for progression to severe COVID-19, including hospitalization or death,  under an EUA.       What should I tell my healthcare provider before I take PAXLOVID?    Tell your healthcare provider if you:   Have any allergies   Have liver or kidney disease   Are pregnant or plan to become pregnant   Are breastfeeding a child   Have any serious illnesses  Tell your healthcare provider about all the medicines you take, including prescription and over-the-counter medicines, vitamins, and herbal supplements.  Some medicines may interact with PAXLOVID and may cause serious side effects.Keep a list of your medicines to show your healthcare provider and pharmacist when  you get a new medicine.    You can ask your healthcare provider or pharmacist for a list of medicines that interact with PAXLOVID. Do not start taking a new medicine without telling your  healthcare provider. Your healthcare provider can tell you if it is safe to take PAXLOVID with other medicines.    Tell your healthcare provider if you are taking combined hormonal contraceptive. PAXLOVID may affect how your birth control pills work. Females who are able to  become pregnant should use another effective alternative form of contraception or an additional barrier method of contraception. Talk to your healthcare provider if you have  any questions about contraceptive methods that might be right for you.    How do I take PAXLOVID?   PAXLOVID consists of 2 medicines: nirmatrelvir and ritonavir.  o Take 2 pink tablets of nirmatrelvir with 1 white tablet of ritonavir by mouth 2 times each day (in the morning and in the evening) for 5 days. For each dose, take all 3 tablets at the same time.  o If you have kidney disease, talk to your healthcare  provider. You may need a different dose.   Swallow the tablets whole. Do not chew, break, or crush the tablets.   Take PAXLOVID with or without food.   Do not stop taking PAXLOVID without talking to your healthcare provider, even if you feel better.   If you miss a dose of PAXLOVID within 8 hours of the time it is usually taken, take it as soon as you remember. If you miss a dose by more than 8 hours, skip the missed dose and take the next dose at your regular time. Do not take 2 doses of PAXLOVID at the same time.   If you take too much PAXLOVID, call your healthcare provider or go to the nearest hospital emergency room right away.   If you are taking a ritonavir- or cobicistat-containing medicine to treat hepatitis C or Human Immunodeficiency Virus (HIV), you should continue to take your medicine as prescribed by your healthcare provider.       Talk to your healthcare provider if you do not feel better or if you feel worse after 5 days.  Who should generally not take PAXLOVID?  Do not take PAXLOVID if:   You are allergic to nirmatrelvir, ritonavir, or any of the ingredients in PAXLOVID.   You are taking any of the following medicines:  o Alfuzosin  o Pethidine, piroxicam, propoxyphene  o Ranolazine  o Amiodarone, dronedarone, flecainide, propafenone, quinidine  o Colchicine  o Lurasidone, pimozide, clozapine  o Dihydroergotamine, ergotamine, methylergonovine  o Lovastatin, simvastatin  o Sildenafil (Revatio®) for pulmonary arterial hypertension (PAH)  o Triazolam, oral midazolam  o Apalutamide  o Carbamazepine, phenobarbital, phenytoin  o Rifampin  o [place]’s Wort (hypericum perforatum)    Taking PAXLOVID with these medicines may cause serious or life-threatening side  effects or affect how PAXLOVID works.    These are not the only medicines that may cause serious side effects if taken with PAXLOVID. PAXLOVID may increase or decrease the levels of multiple other  medicines. It is very important to tell your  healthcare provider about all of the medicines you are taking because additional laboratory tests or changes in the dose of your other  medicines may be necessary while you are taking PAXLOVID. Your healthcare provider may also tell you about specific symptoms to watch out for that may indicate that you  need to stop or decrease the dose of some of your other medicines.    What are the important possible side effects of PAXLOVID?  Possible side effects of PAXLOVID are:   Liver Problems. Tell your healthcare provider right away if you have any of these signs and symptoms of liver problems: loss of appetite, yellowing of your  skin and the whites of eyes (jaundice), dark-colored urine, pale colored stools and itchy skin, stomach area (abdominal) pain.     Resistance to HIV Medicines. If you have untreated HIV infection, PAXLOVID may lead to some HIV medicines not working as well in the future.       Other possible side effects include:  o altered sense of taste  o diarrhea  o high blood pressure  o muscle aches    These are not all the possible side effects of PAXLOVID. Not many people have taken PAXLOVID. Serious and unexpected side effects may happen. PAXLOVID is still being  studied, so it is possible that all of the risks are not known at this time.    What other treatment choices are there?  Like PAXLOVID, FDA may allow for the emergency use of other medicines to treat people with COVID-19. Go to https://www.fda.gov/emergency-preparedness-andr  esponse/mcm-legal-regulatory-and-policy-framework/emergency-use-authorization for information on the emergency use of other medicines that are authorized by FDA   to treat people with COVID-19. Your healthcare provider may talk with you about clinical trials for which you may be eligible.    It is your choice to be treated or not to be treated with PAXLOVID. Should you decide not to receive it or for your child not to receive it, it will not change your standard  medical  care.    What if I am pregnant or breastfeeding?  There is no experience treating pregnant women or breastfeeding mothers with PAXLOVID. For a mother and unborn baby, the benefit of taking PAXLOVID may be  greater than the risk from the treatment. If you are pregnant, discuss your options and specific situation with your healthcare provider. It is recommended that you use effective barrier contraception or do not have sexual activity while taking PAXLOVID.    If you are breastfeeding, discuss your options and specific situation with your healthcare provider.    How do I report side effects with PAXLOVID?  Contact your healthcare provider if you have any side effects that bother you or do not go away.Report side effects to FDA MedWatch at www.fda.gov/[employer] or call 6-092-UTK1470 or you can report side effects to [employer] at the contact information provided Below.    Website Fax number Telephone number [employer] [phone]      How should I store PAXLOVID?  Store PAXLOVID tablets at room temperature, between 68?F to 77?F (20?C to 25?C).  How can I learn more about COVID-19?   Ask your healthcare provider.   Visit https://www.cdc.gov/COVID19.   Contact your local or state public health department.  What is an Emergency Use Authorization (EUA)?  The [place] FDA has made PAXLOVID available under an emergency access mechanism called an Emergency Use Authorization (EUA). The EUA is supported by a  [place] of Health and Human Service (HHS) declaration that circumstances exist to justify the emergency use of drugs and biological products during the COVID-19  pandemic.  PAXLOVID for the treatment of mild-to-moderate COVID-19 in adults and children [12 years of age and older weighing at least 88 pounds (40 kg)] with positive results of  direct SARS-CoV-2 viral testing, and who are at high risk for progression to severe COVID-19, including hospitalization or death, has not  undergone the same type of  review as an FDA-approved product. In issuing an EUA under the COVID-19 public health emergency, the FDA has determined, among other things, that based on the  total amount of scientific evidence available including data from adequate and well-controlled clinical trials, if available, it is reasonable to believe that the product may  be effective for diagnosing, treating, or preventing COVID-19, or a serious or life-threatening disease or condition caused by COVID-19; that the known and potential  benefits of the product, when used to diagnose, treat, or prevent such disease or condition, outweigh the known and potential risks of such product; and that there are no  adequate, approved, and available alternatives. All of these criteria must be met to allow for the product to be used in the treatment of patients during the COVID-19 pandemic. The EUA for PAXLOVID is in effect for the duration of the COVID-19 declaration justifying emergency use of this product, unless  terminated or revoked (after which the products may no longer be used under the EUA)        Coronavirus Disease 2019 (COVID-19): Overview  Coronavirus disease 2019 (COVID-19) is an illness that infects the lungs. It's caused by a type of coronavirus. The virus is called SARS-CoV-2. There are many types of coronaviruses. They are a very common cause of colds and bronchitis. They can cause a lung infection called pneumonia. Symptoms can range from mild to severe. Some people have no symptoms. These types of viruses are also found in some animals.   The virus that causes COVID-19 changes (mutates) all the time. This is what all viruses do. The changes lead to different forms of a virus. These are called variants. COVID-19 variants may spread more easily from person to person. They may cause milder symptoms. Or they may cause more severe symptoms.    The virus spreads and infects people easily. It can infect a person more easily  if they are not immune to it. The virus most often spreads through droplets of fluid that a person coughs or sneezes into the air. It may be spread to you if you touch a surface with the virus on it and then touch your eyes, nose, or mouth.      To help prevent spreading the infection, wash your hands often, or use an alcohol-based hand .     To learn more  For the latest information from the [facility]:    Go to the [facility] website  Call [phone] ([phone])    What are the symptoms of COVID-19?  Some people have no symptoms. Some have mild symptoms. And other people may have severe symptoms. Types of symptoms can vary from person to person. They may appear 2 to 14 days after contact with the virus. They can include:   Fever  Chills  Coughing  Trouble breathing or feeling short of breath  Sore throat  Stuffy or runny nose  Headache  Body aches  Tiredness  Nausea, vomiting, diarrhea, or belly pain  New loss of sense of smell or taste  Check your symptoms with the CDC’s Coronavirus Self-.   What are possible complications of COVID-19?  The virus can cause an infection in the lungs. This is called pneumonia. This can lead to death in some cases. Experts are still learning more about COVID-19 problems. Many other problems are possible. They include:   Low blood pressure  Kidney failure  Inflammation of the brain or heart  Rashes  Some people are at higher risk for problems. This includes:  Older adults  People with heart or lung disease  People with diabetes or kidney disease  People with health conditions that limit the immune system  People who take medicines that limit the immune system  Rarely, a child may have a severe complication. This is called multisystem inflammatory syndrome in children (MIS-C). MIS-C seems to be like Kawasaki disease. This is a rare illness. It causes swelling of blood vessels and body organs. MIS can also happen in adults. But this is less common.     How is COVID-19  diagnosed?  Your healthcare provider will ask:  What symptoms you have  Where you live  If you’ve traveled recently  If you’ve had contact with sick people  If you are vaccinated against COVID-19  If you have had COVID-19  Know your testing options with the CDC's COVID-19 Viral Testing Tool. You may have 1 of these tests for COVID-19:   Viral (molecular) test. You may also hear this called a PCR or RT-PCR test. Viral tests are very accurate. A viral test looks for the genetic material (RNA) of the SARS-CoV-2 virus. There are a few ways to do this. A swab may be wiped inside your nose or throat. Or a long swab may be put into your nose down to the back of your throat. Or a sample of your saliva may be taken. Your test results may be back in 45 minutes to a few hours. This depends on the type of test. Some tests must be sent to a lab. These can take several days for the results. You can now get test kits to use at home. Some of these need a prescription. Follow the instructions in the kit closely if you use a home kit. Some kits show results quickly at home. Others must be sent to a lab for the results.  Antigen test. This can find proteins from the SARS-CoV-2 virus. A swab may be wiped inside your nose or throat. Or a long swab may be put into your nose down to the back of your throat. Some results are back within 15 to 60 minutes. This depends on the type of test. Positive results are very accurate. But false positive results can happen. And the results can be negative even in people with COVID-19. Antigen tests are more likely to miss a COVID-19 infection than a viral (molecular) test. You may need to have a viral test if your antigen test is negative but you have symptoms of COVID-19.  Breath test. This rapid test is not widely available at this time. It finds SARS-CoV-2 infection in the breath. The test is done at providers' offices, hospitals, and mobile testing sites.  You may have other tests if your provider  thinks or confirms that you have COVID-19. These tests may include:   Antibody blood test. This type of test can show if you had the virus in the past. It shows antibodies for the virus in the blood. The accuracy of these tests varies. And they are not available everywhere. An antibody test may not show if you have an infection right now. This is because it can take up to a few weeks for your body to make antibodies. None of the antibody tests can yet be used to tell if a person is immune to the virus.  Sputum culture. If you have a wet cough, you may be asked to cough up a bit of mucus (sputum) from your lungs. This is tested for the virus. It may be tested for pneumonia.  Imaging tests. You may have a chest X-ray or CT scan.  Can you get COVID-19 again?  Yes, you can get COVID-19 more than once. You may not have immunity. You could have lost the immunity. Or you may get COVID-19 from a different strain (variant) of the virus that you are not immune to. But the COVID-19 vaccine helps lower the risk for COVID-19.   Vaccines for COVID-19  The FDA and CDC advise vaccines to help prevent COVID-19. The vaccines can also make the illness less severe. It can keep you from needing to go to the hospital.  And it can prevent the spread of the virus to others. No vaccine is 100% effective at preventing an illness. But getting a vaccine is important. The Pfizer vaccine is available for people as young as age 5. Pregnant or breastfeeding people can have the vaccine. Ask your healthcare provider which vaccine may be best for you.   The vaccines are given as a shot (injection). This is most often done in a muscle in the upper arm. There is a 1-dose vaccine. This is from [employer]. There are 2-dose vaccines. These are from Pf[employer] and Moderna. For a 2-dose vaccine, the second dose is given several weeks after the first. Some people may need a third dose of Pfizer or Moderna.   Who needs a third dose of Pfizer or  Moderna?  Some people may need a third dose of the Pfizer or Moderna vaccine. This is for people who have a very weak immune system. This can happen if you had a solid organ transplant. It can be caused by other conditions or treatments. The third dose is part of their first (initial) series. It's not a booster. This third dose is to help a person with a weak immune system build up better protection against the virus. It's given at least 28 days after the second dose. Talk with your healthcare provider about health risks. This will see if you or your child need a third dose.   COVID-19 vaccine booster shots  People age 5 or older can get a COVID-19 booster shot. Talk with your healthcare provider about you or your child's situation and risk. The time between getting your initial series and a booster shot varies by vaccine. It may change depending on data about COVID-19 variants. People ages 50 and older and people with certain conditions that weaken the immune system may be advised to get a 2nd booster dose of the mRNA (Pfizer or Moderna) vaccine. This should happen at least 4 months after getting the first booster. Follow your healthcare provider's instructions about your risk and when to return for your booster.   Pfizer booster  A Pfizer booster shot is approved for people as young as 5. For most people, it's given at least 5 months after the initial series.   Moderna booster  A Moderna booster shot is approved for adults age 18 or older. For most people, it's given at least 5 months after the initial series.   Yusuf & Yusuf (J&J) booster  A J&J booster is approved for people ages 18 or older. It's given at least 2 months after the first J&J shot.   Mix and match  If you are 18 or older, you may be able to choose which vaccine you get as a booster. This is called mix and match. Talk with your healthcare provider to learn more.   How is COVID-19 treated?  The best treatments right now are those to help your  body while it fights the virus. This is called supportive care. It includes:   Rest.This helps your body fight the illness.  Fluids. Try to drink 6 to 8 glasses of fluids every day. Ask your provider which drinks are best for you. Don't have drinks with caffeine or alcohol.  Over-the-counter (OTC) medicine. These are used to help ease pain and reduce fever. Ask your provider which OTC medicine is safe for you to use.  Talk with your provider if you have confirmed COVID-19. You may qualify for medicines approved by the FDA to prevent severe COVID-19 infection.   You may need to stay in the hospital for severe illness. Your care may include:   IV fluids. These are given through a vein. This helps to replace fluids in your body.  Oxygen. You may be given supplemental oxygen. Or you may be put on a breathing machine (ventilator). This is done so you get enough oxygen in your body.  Prone positioning. Your healthcare team may regularly turn you on your stomach. This is called prone positioning. It helps increase the amount of oxygen you get to your lungs. Follow their instructions on position changes while you're in the hospital. Also follow their advice on the best positions to help your breathing once you go home.  Remdesivir. This antiviral stops the spread of the SARS-CoV-2 virus in the body. It's not for everyone with COVID-19. The FDA approved it only for certain people with COVID-19 who are at high risk for severe infection. Talk with your healthcare provider to learn more.  Steroids or other anti-inflammatory medicines.  These are used to lessen the intense inflammation that some people with COVID-19 can have. The inflammation can lead to more trouble breathing. It can cause other complications or death.  COVID-19 convalescent plasma. Plasma is the liquid part of blood. People who had COVID-19 may be asked to donate plasma. This is called COVID-19 convalescent plasma. The plasma may have antibodies. These can  help fight COVID-19 in people who are very ill with it. The FDA has approved it for emergency use in some people with severe but early COVID-19. Ask your provider if you qualify to donate.  Monoclonal antibody therapy. The FDA approved this for emergency use in some people. They must have a positive COVID-19 viral test. They must have mild to moderate symptoms. They can’t be in the hospital. It's approved for people 12 years and older. They must weigh at least 88 pounds (40 kgs). And they must be at high risk for severe COVID-19 and a hospital stay. This includes people who are 65 years and older. And it includes people with some chronic conditions. This therapy is not approved for people who are in the hospital with COVID-19 or who need oxygen. Your healthcare team will tell you if you qualify.  Are you at risk for COVID-19?  You are at risk for COVID-19 if any of these apply to you:  You live in an area with cases of COVID-19  You traveled to an area with cases of COVID-19  You had close contact with someone who had COVID-19  Close contact means being within 6 feet.   Keep in mind that COVID-19 may be spread by people who don't show symptoms.   Last updated: 5/20/2022   [employer]nav[employer] last reviewed this educational content on 9/1/2021 © 2000-2022 [employer]. All rights reserved. This information is not intended as a substitute for professional medical care. Always follow your healthcare professional's instructions.         good air movement/clear to auscultation bilaterally/no wheezes/no rhonchi/respirations non-labored/no rales/no chest wall tenderness

## 2023-06-22 NOTE — ED PROVIDER NOTE - EXTREMITY EXAM
[FreeTextEntry1] : Pt clinically stable, will continue current management, not a candidate for psilocybin research locally ( looking for volunteers without Axis III conditions).  right lower extremity findings

## 2023-08-31 NOTE — ED ADULT NURSE NOTE - NS_NURSE_DISC_TEACHING_YN_ED_ALL_ED
Goal Outcome Evaluation:  Plan of Care Reviewed With: patient        Progress: no change  Outcome Evaluation: 89 y.o. male with pmh hypothyroidism, Parkinson's, coronary artery disease, hyperlipidemia, GERD, BPH, hypertension, congestive heart failure who presented to [facility] on 8/30/2023 following a syncopal event. Pt became unarousable and CG was present, called EMS. Lives with spouse. 2 CT done and note some atypical areas in the left frontal lobe but cause or etiology indeterminate. See radiology notes. Pt was mildly confused and described his baseline and home functioning but was not certain about some things. There were no contacts on file or family at bedside to cooberate pt self-report that he at times needs min (A) with ADL, that he uses RW, and has CG 2 days/week for IADL. Pt was eager to get OOB and came to sit EOB with mod (A). He stood with min (A) of 2 and then slowly demonstrated AMS and was assisted to sit on the EOB. BP was not checked and some signs appeared like OH but some signs appeared neurological. His Parkinson's complicates symptom presentation. He appears unsafe to go home. OT recommends IP rehab at this time with the caveat that his case is developing and complex so OT will follow and update d/c rec's as able.      Anticipated Discharge Disposition (OT): inpatient rehabilitation facility   Yes

## 2023-12-05 NOTE — H&P ADULT - PROBLEM/PLAN-3
Patient is also here for follow-up of hypertension.. Symptoms do not include headache confusion visual disturbance dizziness shortness of breath chest pain syncope or palpitations. Recent blood pressure patient has   been checking. By report there is good compliance with treatment. Pertinent medical history includes diabetes/hyperlipidemia    stable and continue meds    DISPLAY PLAN FREE TEXT

## 2024-06-05 NOTE — ED ADULT NURSE NOTE - DOES PATIENT HAVE ADVANCE DIRECTIVE
Answers submitted by the patient for this visit:  Pulmonology Questionnaire (Submitted on 6/1/2024)  Chief Complaint: Primary symptoms  Which of the following makes your symptoms worse?: nothing    Assessment:    1. Obstructive sleep apnea  PAP DME Resupply/Reorder      2. History of pulmonary embolism        3. Nocturnal hypoxemia              Plan:   Patient presenting for follow-up  They are using the CPAP and benefitting from it.  Better quality of sleep at night and more energy throughout the day.  Due to mask issues, struggling with compliance lately. Will order him new supplies which should help.   Reviewed compliance report with the patient demonstrating residual AHI is acceptable at 0.4 (previously > 16)   Will continue CPAP at current pressure settings  Discussed regular cleaning and changing of the supplies  Patient is aware of consequences of untreated sleep apnea including increased risk for cardiac disease and stroke and therefore the need for compliance  Remains on lifelong anticoagulation - Xarelto      Subjective:     Patient ID: Norman Coles is a 65 y.o. male.    Chief Complaint:  Norman Coles is a very pleasant 65 y.o. male with PMHx of SOL, PE, DVT, hypertension presenting for follow-up regarding CPAP management.  Patient reports this is helping quite a bit with his quality of sleep and energy level during the day.  No longer snoring loudly. His mask is old and not fitting as well, making it harder for him to use.      He denies having shortness of breath during the day.       He is a lifelong nonsmoker.  He did have some secondhand exposure to cigarette smoke in his childhood home.     Feels overall he is doing well. Goes golfing twice a week. Averages 8500 steps a day.    General  Pertinent negatives include no coughing or fatigue.     The following portions of the patient's history were reviewed in this encounter and updated as appropriate: [unfilled]  Review of Systems    Constitutional:  Negative for appetite change and fatigue.   Respiratory:  Negative for cough and shortness of breath.    Psychiatric/Behavioral:  Positive for sleep disturbance.    All other systems reviewed and are negative.        Objective:    Physical Exam  Vitals reviewed.   Constitutional:       General: He is not in acute distress.     Appearance: He is not toxic-appearing.   HENT:      Head: Normocephalic and atraumatic.   Eyes:      General: No scleral icterus.  Cardiovascular:      Rate and Rhythm: Normal rate and regular rhythm.   Pulmonary:      Effort: Pulmonary effort is normal.      Breath sounds: Normal breath sounds.   Musculoskeletal:         General: No signs of injury.   Skin:     General: Skin is warm and dry.   Neurological:      General: No focal deficit present.      Mental Status: He is alert. Mental status is at baseline.   Psychiatric:         Mood and Affect: Mood normal.         Behavior: Behavior normal.         Lab Review:   No visits with results within 2 Month(s) from this visit.   Latest known visit with results is:   Appointment on 04/01/2024   Component Date Value    Prostate Specific Antige* 04/01/2024 5.6 (H)     PSA, Free 04/01/2024 1.04     PSA, Free Pct 04/01/2024 18.6       No
